# Patient Record
Sex: FEMALE | Race: WHITE | NOT HISPANIC OR LATINO | Employment: FULL TIME | URBAN - METROPOLITAN AREA
[De-identification: names, ages, dates, MRNs, and addresses within clinical notes are randomized per-mention and may not be internally consistent; named-entity substitution may affect disease eponyms.]

---

## 2019-07-25 ENCOUNTER — OFFICE VISIT (OUTPATIENT)
Dept: OBGYN CLINIC | Facility: CLINIC | Age: 29
End: 2019-07-25
Payer: COMMERCIAL

## 2019-07-25 VITALS — DIASTOLIC BLOOD PRESSURE: 60 MMHG | SYSTOLIC BLOOD PRESSURE: 100 MMHG | WEIGHT: 128 LBS

## 2019-07-25 DIAGNOSIS — Z30.41 ENCOUNTER FOR SURVEILLANCE OF CONTRACEPTIVE PILLS: Primary | ICD-10-CM

## 2019-07-25 PROCEDURE — 99202 OFFICE O/P NEW SF 15 MIN: CPT | Performed by: NURSE PRACTITIONER

## 2019-07-25 RX ORDER — BIOTIN 10 MG
TABLET ORAL
COMMUNITY

## 2019-07-25 RX ORDER — ETHINYL ESTRADIOL/DROSPIRENONE 0.02-3(28)
TABLET ORAL
Refills: 0 | COMMUNITY
Start: 2019-06-28 | End: 2019-07-25 | Stop reason: SDUPTHER

## 2019-07-25 RX ORDER — ETHINYL ESTRADIOL/DROSPIRENONE 0.02-3(28)
1 TABLET ORAL DAILY
Qty: 84 TABLET | Refills: 0 | Status: SHIPPED | OUTPATIENT
Start: 2019-07-25 | End: 2019-10-04 | Stop reason: SDUPTHER

## 2019-07-25 NOTE — PROGRESS NOTES
Assessment/Plan   There are no diagnoses linked to this encounter  Discussion    Reviewed with patient normal exam today  Pap with HPV done today  Normal breast exam today  Monthly SBEs advised  Vitamin D and Calcim Supplements advised  Exercise most days of the week  Follow with PCP for regular check-ups and blood work  RTO 1 year for annual or sooner as needed  Annette Dubois is a 33 y o  female old/new patient who presents for annual well woman exam     Last exam  Pap   Pap guidelines reviewed with patient  Pt would like Pap today  Pt denies any abnormal vaginal discharge, itching, or odor  Pt in a mutually exclusive relationship () with a male partner and denies the need for STD testing today  Menstrual Cycle:  LMP:   Contraception:   Practices monthly SBEs, no breast complaints today  Last Mammogram   Colonoscopy   Denies any bowel or bladder issues  Pt follows with PCP for regular check-ups and blood work  Review of Systems   All other systems reviewed and are negative  The following portions of the patient's history were reviewed and updated as appropriate: allergies, current medications, past family history, past medical history, past social history, past surgical history and problem list     No past medical history on file  No past surgical history on file  No family history on file      Social History     Socioeconomic History    Marital status: Unknown     Spouse name: Not on file    Number of children: Not on file    Years of education: Not on file    Highest education level: Not on file   Occupational History    Not on file   Social Needs    Financial resource strain: Not on file    Food insecurity:     Worry: Not on file     Inability: Not on file    Transportation needs:     Medical: Not on file     Non-medical: Not on file   Tobacco Use    Smoking status: Not on file   Substance and Sexual Activity    Alcohol use: Not on file  Drug use: Not on file    Sexual activity: Not on file   Lifestyle    Physical activity:     Days per week: Not on file     Minutes per session: Not on file    Stress: Not on file   Relationships    Social connections:     Talks on phone: Not on file     Gets together: Not on file     Attends Muslim service: Not on file     Active member of club or organization: Not on file     Attends meetings of clubs or organizations: Not on file     Relationship status: Not on file    Intimate partner violence:     Fear of current or ex partner: Not on file     Emotionally abused: Not on file     Physically abused: Not on file     Forced sexual activity: Not on file   Other Topics Concern    Not on file   Social History Narrative    Not on file       No current outpatient medications on file  Allergies not on file    Objective   There were no vitals filed for this visit  Physical Exam   Constitutional: She is oriented to person, place, and time  She appears well-developed and well-nourished  HENT:   Head: Normocephalic  Neck: Normal range of motion  Neck supple  No tracheal deviation present  No thyromegaly present  Cardiovascular: Normal rate, regular rhythm and normal heart sounds  Pulmonary/Chest: Effort normal and breath sounds normal  Right breast exhibits no inverted nipple, no mass, no nipple discharge, no skin change and no tenderness  Left breast exhibits no inverted nipple, no mass, no nipple discharge, no skin change and no tenderness  No breast tenderness, discharge or bleeding  Breasts are symmetrical    Abdominal: Soft  Bowel sounds are normal  She exhibits no distension and no mass  There is no tenderness  There is no rebound and no guarding  Genitourinary: Vagina normal and uterus normal  No breast tenderness, discharge or bleeding  No labial fusion  There is no rash, tenderness, lesion or injury on the right labia  There is no rash, tenderness, lesion or injury on the left labia  Cervix exhibits no motion tenderness, no discharge and no friability  Right adnexum displays no mass, no tenderness and no fullness  Left adnexum displays no mass, no tenderness and no fullness  Musculoskeletal: Normal range of motion  Neurological: She is alert and oriented to person, place, and time  Skin: Skin is warm and dry  Psychiatric: She has a normal mood and affect   Her behavior is normal  Judgment and thought content normal

## 2019-07-25 NOTE — ASSESSMENT & PLAN NOTE
Contraceptive options reviewed  Discussed continuing Nany since no side effects, switching another OCP or another contraceptive method  Contraceptive methods reviewed such as patch, ring, injection, implant, IUD  Would like to stick with a pill  Reviewed can switch OCP but cannot guarantee she will not have side effects or that her menses will be any lighter or shorter than they currently are  Pt decided to continue with Nany as worried about potential side effects she might occur from other methods or another OCP  Rx for True Ivonne (Brand name requested per patient)  sent to pharmacy     RTO for annual exam

## 2019-07-25 NOTE — PROGRESS NOTES
Assessment/Plan:    Encounter for surveillance of contraceptive pills  Contraceptive options reviewed  Discussed continuing Kory since no side effects, switching another OCP or another contraceptive method  Contraceptive methods reviewed such as patch, ring, injection, implant, IUD  Would like to stick with a pill  Reviewed can switch OCP but cannot guarantee she will not have side effects or that her menses will be any lighter or shorter than they currently are  Pt decided to continue with Kory as worried about potential side effects she might occur from other methods or another OCP  Rx for Katelyn Sales (Brand name requested per patient)  sent to pharmacy  RTO for annual exam       Diagnoses and all orders for this visit:    Encounter for surveillance of contraceptive pills  -     KORY 3-0 02 MG per tablet; Take 1 tablet by mouth daily    Other orders  -     Biotin 10 MG TABS; Take by mouth  -     Discontinue: KORY 3-0 02 MG per tablet; TK 1 T PO D          Subjective:      Patient ID: Librado Fisher is a 33 y o  female  Pt is a new patient to our office and originally scheduled for annual exam but due to menses had to reschedule  Would like to discuss birth control, so appointment changed to contraceptive discussion  Menses were very irregular and started on OCP at age 14  Has been on Kory ever since  Has never tried anything else  Thinks menses are longer and slightly heavier than then should be on OCP  Menses occur monthly last about 4-7 days and are of a moderate flow  Will change tampon every 3-4 hours on her heaviest days  Has about 2-3 heavy days  Denies any bleeding in between menses  Denies any pain with menses  Denies any other side effects from OCP  Would like to discuss other options      LMP: 7/21/19   Period Cycle (Days): 30  Period Duration (Days): 4-7  Period Pattern: Regular  Menstrual Flow: Moderate  Menstrual Control: Tampon  Menstrual Control Change Freq (Hours): 3-4  Dysmenorrhea: None    Last annual exam and pap smear 2017 Abnormal, unsure of abnormality  The following portions of the patient's history were reviewed and updated as appropriate: allergies, current medications, past family history, past medical history, past social history, past surgical history and problem list     Review of Systems   All other systems reviewed and are negative  Objective:      /60   Wt 58 1 kg (128 lb)   LMP 07/21/2019   Breastfeeding? No          Physical Exam   Constitutional: She is oriented to person, place, and time  She appears well-developed and well-nourished  Cardiovascular: Normal rate, regular rhythm and normal heart sounds  Pulmonary/Chest: Effort normal and breath sounds normal    Abdominal: Soft  Bowel sounds are normal    Musculoskeletal: Normal range of motion  Neurological: She is alert and oriented to person, place, and time  Skin: Skin is warm and dry  Psychiatric: She has a normal mood and affect   Her behavior is normal  Judgment and thought content normal

## 2019-10-04 DIAGNOSIS — Z30.41 ENCOUNTER FOR SURVEILLANCE OF CONTRACEPTIVE PILLS: ICD-10-CM

## 2020-04-24 DIAGNOSIS — Z30.41 ENCOUNTER FOR SURVEILLANCE OF CONTRACEPTIVE PILLS: ICD-10-CM

## 2020-04-24 RX ORDER — DROSPIRENONE AND ETHINYL ESTRADIOL 0.02-3(28)
1 KIT ORAL DAILY
Qty: 84 TABLET | Refills: 2 | Status: SHIPPED | OUTPATIENT
Start: 2020-04-24 | End: 2020-11-03

## 2020-11-02 DIAGNOSIS — Z30.41 ENCOUNTER FOR SURVEILLANCE OF CONTRACEPTIVE PILLS: ICD-10-CM

## 2021-01-27 ENCOUNTER — ANNUAL EXAM (OUTPATIENT)
Dept: OBGYN CLINIC | Facility: CLINIC | Age: 31
End: 2021-01-27
Payer: COMMERCIAL

## 2021-01-27 VITALS
HEIGHT: 67 IN | WEIGHT: 130 LBS | SYSTOLIC BLOOD PRESSURE: 110 MMHG | DIASTOLIC BLOOD PRESSURE: 76 MMHG | BODY MASS INDEX: 20.4 KG/M2

## 2021-01-27 DIAGNOSIS — Z01.419 ROUTINE GYNECOLOGICAL EXAMINATION: Primary | ICD-10-CM

## 2021-01-27 DIAGNOSIS — Z30.41 ENCOUNTER FOR SURVEILLANCE OF CONTRACEPTIVE PILLS: ICD-10-CM

## 2021-01-27 DIAGNOSIS — N92.6 IRREGULAR MENSES: ICD-10-CM

## 2021-01-27 PROCEDURE — 99395 PREV VISIT EST AGE 18-39: CPT | Performed by: PHYSICIAN ASSISTANT

## 2021-01-27 RX ORDER — DROSPIRENONE AND ETHINYL ESTRADIOL 0.02-3(28)
1 KIT ORAL DAILY
Qty: 84 TABLET | Refills: 3 | Status: SHIPPED | OUTPATIENT
Start: 2021-01-27

## 2021-01-27 RX ORDER — ADAPALENE AND BENZOYL PEROXIDE 3; 25 MG/G; MG/G
GEL TOPICAL
COMMUNITY
Start: 2020-11-03

## 2021-01-27 NOTE — PROGRESS NOTES
Assessment/Plan   Problem List Items Addressed This Visit        Other    Encounter for surveillance of contraceptive pills    Relevant Medications    drospirenone-ethinyl estradiol (Gianvi) 3-0 02 MG per tablet    Routine gynecological examination - Primary     Pap guidelines reviewed  Pap with reflex done today  Reviewed irregular menses with patient  Will plan TFTs and CBC to further evaluate  Reviewed option of switching to a different OCP to see if would help  Would recommend considering [facility] OCP  Patient will consider and call office if desires switch  Will return to office for annual or as needed  Relevant Orders    IGP, rfx Aptima HPV ASCU      Other Visit Diagnoses     Irregular menses        Relevant Orders    T4, free    TSH, 3rd generation    CBC and differential          Subjective:     Patient ID: Sophia Greenwood is a 35 y o  y o  female  HPI  34 yo seen for annual exam  Currently on Nany OCP  Reports has been on since she was a teenager  Had tried Ortho Tricyclen first as well as Loestrin and didn't tolerate well  Had issues with acne and mood swings at that time and was recommended Nany  Patient reports tolerates Nany well but has always had irregular bleeding on it  Typically gets menses twice a month and not always during the placebo week  Sometimes light, other times normal flow  Denies any cramping  Reports did have history of ovarian cyst in high school and had a pelvic ultrasound done at that time that just showed the cyst    Last pap: 2017 LGSIL with (+)HRHPV Colpo benign  The following portions of the patient's history were reviewed and updated as appropriate:   She  has a past medical history of Asthma  She   Patient Active Problem List    Diagnosis Date Noted    Routine gynecological examination 01/27/2021    Encounter for surveillance of contraceptive pills 07/25/2019     She  has a past surgical history that includes Appendectomy    Her Family history is unknown by patient  She  reports that she has never smoked  She has never used smokeless tobacco  She reports current alcohol use  She reports that she does not use drugs  Current Outpatient Medications   Medication Sig Dispense Refill    drospirenone-ethinyl estradiol (Gianvi) 3-0 02 MG per tablet Take 1 tablet by mouth daily 84 tablet 3    Epiduo Forte 0 3-2 5 % GEL APPLY AT BEDTIME      Biotin 10 MG TABS Take by mouth       No current facility-administered medications for this visit  She has No Known Allergies       Menstrual History:  OB History        0    Para   0    Term   0       0    AB   0    Living   0       SAB   0    TAB   0    Ectopic   0    Multiple   0    Live Births   0                  Patient's last menstrual period was 2021 (approximate)  Review of Systems   Constitutional: Negative for fatigue, fever and unexpected weight change  HENT: Negative for dental problem and sinus pressure  Eyes: Negative for visual disturbance  Respiratory: Negative for cough, shortness of breath and wheezing  Cardiovascular: Negative for chest pain  Gastrointestinal: Negative for abdominal pain, blood in stool, constipation, diarrhea, nausea and vomiting  Endocrine: Negative for polydipsia  Genitourinary: Positive for menstrual problem  Negative for difficulty urinating, dyspareunia, dysuria, frequency, hematuria, pelvic pain and urgency  Musculoskeletal: Negative for arthralgias and back pain  Neurological: Negative for dizziness, seizures, light-headedness and headaches  Psychiatric/Behavioral: Negative for suicidal ideas  The patient is not nervous/anxious  Objective:  Vitals:    21 0732   BP: 110/76   BP Location: Left arm   Patient Position: Sitting   Cuff Size: Standard   Weight: 59 kg (130 lb)   Height: 5' 7" (1 702 m)      Physical Exam  Constitutional:       Appearance: Normal appearance  She is well-developed     Genitourinary:      Vulva, urethra, bladder, vagina and uterus normal       No vulval condylomata, lesion, tenderness, ulcerations, Bartholin's cyst or rash noted  No signs of labial injury  No labial fusion  No inguinal adenopathy present in the right or left side  No urethral prolapse, pain, swelling, tenderness, caruncle, mass or diverticulum present  Bladder is not distended or tender  No signs of injury or lesions in the vagina  No vaginal discharge, erythema, tenderness or bleeding  No cervical motion tenderness, discharge or lesion  Uterus is not enlarged, tender, irregular or mobile  No uterine mass detected  No right or left adnexal mass present  Right adnexa not tender or full  Left adnexa not tender or full  HENT:      Head: Normocephalic and atraumatic  Neck:      Thyroid: No thyromegaly  Cardiovascular:      Rate and Rhythm: Normal rate and regular rhythm  Heart sounds: Normal heart sounds  No murmur  No friction rub  No gallop  Pulmonary:      Effort: Pulmonary effort is normal  No respiratory distress  Breath sounds: Normal breath sounds  No wheezing  Chest:      Breasts: Breasts are symmetrical          Right: Normal  No swelling, bleeding, inverted nipple, mass, nipple discharge, skin change or tenderness  Left: Normal  No swelling, bleeding, inverted nipple, mass, nipple discharge, skin change or tenderness  Abdominal:      General: There is no distension  Palpations: Abdomen is soft  There is no mass  Tenderness: There is no abdominal tenderness  There is no guarding or rebound  Hernia: No hernia is present  Lymphadenopathy:      Cervical: No cervical adenopathy  Upper Body:      Right upper body: No supraclavicular, axillary or pectoral adenopathy  Left upper body: No supraclavicular, axillary or pectoral adenopathy  Lower Body: No right inguinal adenopathy  No left inguinal adenopathy     Neurological: Mental Status: She is alert and oriented to person, place, and time  Skin:     General: Skin is warm and dry     Psychiatric:         Behavior: Behavior normal

## 2021-01-27 NOTE — ASSESSMENT & PLAN NOTE
Pap guidelines reviewed  Pap with reflex done today  Reviewed irregular menses with patient  Will plan TFTs and CBC to further evaluate  Reviewed option of switching to a different OCP to see if would help  Would recommend considering [facility] OCP  Patient will consider and call office if desires switch  Will return to office for annual or as needed

## 2021-01-30 LAB
CYTOLOGIST CVX/VAG CYTO: NORMAL
DX ICD CODE: NORMAL
OTHER STN SPEC: NORMAL
OTHER STN SPEC: NORMAL
PATH REPORT.FINAL DX SPEC: NORMAL
SL AMB NOTE:: NORMAL
SL AMB SPECIMEN ADEQUACY: NORMAL
SL AMB TEST METHODOLOGY: NORMAL

## 2021-02-05 ENCOUNTER — TELEPHONE (OUTPATIENT)
Dept: OBGYN CLINIC | Facility: CLINIC | Age: 31
End: 2021-02-05

## 2021-02-10 LAB
BASOPHILS # BLD AUTO: 28 CELLS/UL (ref 0–200)
BASOPHILS NFR BLD AUTO: 0.8 %
EOSINOPHIL # BLD AUTO: 39 CELLS/UL (ref 15–500)
EOSINOPHIL NFR BLD AUTO: 1.1 %
ERYTHROCYTE [DISTWIDTH] IN BLOOD BY AUTOMATED COUNT: 11.8 % (ref 11–15)
HCT VFR BLD AUTO: 39.1 % (ref 35–45)
HGB BLD-MCNC: 13.1 G/DL (ref 11.7–15.5)
LYMPHOCYTES # BLD AUTO: 1260 CELLS/UL (ref 850–3900)
LYMPHOCYTES NFR BLD AUTO: 36 %
MCH RBC QN AUTO: 31 PG (ref 27–33)
MCHC RBC AUTO-ENTMCNC: 33.5 G/DL (ref 32–36)
MCV RBC AUTO: 92.7 FL (ref 80–100)
MONOCYTES # BLD AUTO: 277 CELLS/UL (ref 200–950)
MONOCYTES NFR BLD AUTO: 7.9 %
NEUTROPHILS # BLD AUTO: 1897 CELLS/UL (ref 1500–7800)
NEUTROPHILS NFR BLD AUTO: 54.2 %
PLATELET # BLD AUTO: 201 THOUSAND/UL (ref 140–400)
PMV BLD REES-ECKER: 10.5 FL (ref 7.5–12.5)
RBC # BLD AUTO: 4.22 MILLION/UL (ref 3.8–5.1)
T4 FREE SERPL-MCNC: 1.2 NG/DL (ref 0.8–1.8)
TSH SERPL-ACNC: 2.21 MIU/L
WBC # BLD AUTO: 3.5 THOUSAND/UL (ref 3.8–10.8)

## 2021-02-16 ENCOUNTER — TELEPHONE (OUTPATIENT)
Dept: OBGYN CLINIC | Facility: CLINIC | Age: 31
End: 2021-02-16

## 2021-03-31 DIAGNOSIS — Z23 ENCOUNTER FOR IMMUNIZATION: ICD-10-CM

## 2021-04-05 ENCOUNTER — OFFICE VISIT (OUTPATIENT)
Dept: FAMILY MEDICINE CLINIC | Facility: CLINIC | Age: 31
End: 2021-04-05
Payer: COMMERCIAL

## 2021-04-05 VITALS
BODY MASS INDEX: 20.76 KG/M2 | HEIGHT: 66 IN | DIASTOLIC BLOOD PRESSURE: 82 MMHG | TEMPERATURE: 97.2 F | OXYGEN SATURATION: 99 % | SYSTOLIC BLOOD PRESSURE: 110 MMHG | WEIGHT: 129.2 LBS | HEART RATE: 74 BPM

## 2021-04-05 DIAGNOSIS — Z11.4 SCREENING FOR HIV (HUMAN IMMUNODEFICIENCY VIRUS): ICD-10-CM

## 2021-04-05 DIAGNOSIS — E53.8 CYANOCOBALAMIN DEFICIENCY: ICD-10-CM

## 2021-04-05 DIAGNOSIS — D22.5 MELANOCYTIC NEVUS OF TRUNK: ICD-10-CM

## 2021-04-05 DIAGNOSIS — R19.7 DIARRHEA, UNSPECIFIED TYPE: ICD-10-CM

## 2021-04-05 DIAGNOSIS — L70.0 ACNE VULGARIS: ICD-10-CM

## 2021-04-05 DIAGNOSIS — Z28.39 IMMUNIZATION DEFICIENCY: ICD-10-CM

## 2021-04-05 DIAGNOSIS — E55.9 VITAMIN D DEFICIENCY: ICD-10-CM

## 2021-04-05 DIAGNOSIS — E51.9 THIAMINE DEFICIENCY: ICD-10-CM

## 2021-04-05 DIAGNOSIS — Z79.899 ENCOUNTER FOR LONG-TERM CURRENT USE OF MEDICATION: ICD-10-CM

## 2021-04-05 DIAGNOSIS — E53.1 PYRIDOXINE DEFICIENCY: ICD-10-CM

## 2021-04-05 DIAGNOSIS — E54 ASCORBIC ACID DEFICIENCY: ICD-10-CM

## 2021-04-05 DIAGNOSIS — N92.6 IRREGULAR MENSES: Primary | ICD-10-CM

## 2021-04-05 DIAGNOSIS — Z13.6 SCREENING FOR CARDIOVASCULAR CONDITION: ICD-10-CM

## 2021-04-05 PROBLEM — D22.9 PIGMENTED NEVUS: Status: ACTIVE | Noted: 2021-04-05

## 2021-04-05 PROCEDURE — 3008F BODY MASS INDEX DOCD: CPT | Performed by: FAMILY MEDICINE

## 2021-04-05 PROCEDURE — 3725F SCREEN DEPRESSION PERFORMED: CPT | Performed by: FAMILY MEDICINE

## 2021-04-05 PROCEDURE — 99204 OFFICE O/P NEW MOD 45 MIN: CPT | Performed by: FAMILY MEDICINE

## 2021-04-05 NOTE — PROGRESS NOTES
Assessment/Plan:  Will need workup for PCOS inflammatory bowel disease will look into cushion ordered and stress hormone  She is on birth control for long time will look into cholesterol level checked  MCV is large on blood test will look into vitamin D level  Will need to monitor her moles on her body closely  Will see her back in 3 months follow-up blood test results and follow-up her skin check  Advised to check with insurance for HPV vaccine coverage  Advised to stop probiotics Pap supplement with niacin orally that can increase her blood sugar  I have spent 45 minutes with Patient  today in which greater than 50% of this time was spent in counseling/coordination of care regarding Risks and benefits of tx options             Problem List Items Addressed This Visit        Musculoskeletal and Integument    Acne vulgaris       Other    Irregular menses - Primary    Relevant Orders    Testosterone, free, total    FSH and LH    17-Hydroxyprogesterone    Prolactin    Antimullerian hormone (AMH)    DHEA-sulfate    Diarrhea    Relevant Orders    CORAL Screen w/ Reflex to Titer/Pattern    CBC and differential    Celiac Disease Panel    Comprehensive metabolic panel    H  pylori antigen, stool    TSH, 3rd generation with Free T4 reflex    UA w Reflex to Microscopic w Reflex to Culture    Inflammatory Bowel Disease-IBD    Sedimentation rate, automated    C-reactive protein    Chromogranin A    Cortisol Level, AM Specimen    Pigmented nevus    Encounter for long-term current use of medication    Relevant Orders    Lipid Panel with Direct LDL reflex      Other Visit Diagnoses     Immunization deficiency        Relevant Orders    Hepatitis A antibody, total    Hepatitis B surface antibody    Measles/Mumps/Rubella Immunity    Vitamin D deficiency        Relevant Orders    Vitamin D 25 hydroxy    Cyanocobalamin deficiency        Relevant Orders    Vitamin B12    Screening for cardiovascular condition        Relevant Orders    Lipid Panel with Direct LDL reflex    Thiamine deficiency        Relevant Orders    Vitamin B1 (Thiamine), Serum/Plasma, LC/MS/MS    Pyridoxine deficiency        Relevant Orders    Vitamin B6    Ascorbic acid deficiency        Relevant Orders    Vitamin C    Screening for HIV (human immunodeficiency virus)        Relevant Orders    HIV 1/2 Antigen/Antibody (4th Generation) w Reflex [facility]            Subjective:      Patient ID: Gabriela Dorsey is a 35 y o  female  25-year-old female for establish care  She has suffer from irregular menses  For as long as she can remember  She had menses in 5th grade  Since then has always been irregular  She was placed on birth control at 14years old and still her menses not regular  She also had acne and follow-up with dermatologist   She was placed on spironolactone and carmela which helped her acne but then she was concerned about spironolactone causing high potassium so she stopped  She was told she has anemia in the past   Recently she saw gyn had CBC in thyroid level checked that was normal   There was suggestion to change her birth control but she is hesitant about that  She is   She is not ready to get started on family right now  She also suffer from diarrhea for as long she can remember  She was checked by gastroenterologist in the past workup was normal   She had appendix removed in 2012 was told that she has microscopic colitis every day that she didn't noticed  No family history of inflammatory bowel disease  There is maternal grandmom with questionable ovaries issues  She has never been pregnant  She works in [employer]    She has had her [address] vaccine=had fever/myalgia after      The following portions of the patient's history were reviewed and updated as appropriate:   Past Medical History:  She has a past medical history of Acne vulgaris (4/5/2021), Asthma, Diarrhea (4/5/2021), Encounter for long-term current use of medication (4/5/2021), Irregular menses (4/5/2021), and Pigmented nevus (4/5/2021)  ,  _______________________________________________________________________  Medical Problems:  does not have any pertinent problems on file ,  _______________________________________________________________________  Past Surgical History:   has a past surgical history that includes Appendectomy  ,  _______________________________________________________________________  Family History:  Family history is unknown by patient  ,  _______________________________________________________________________  Social History:   reports that she has never smoked  She has never used smokeless tobacco  She reports current alcohol use  She reports that she does not use drugs  ,  _______________________________________________________________________  Allergies:  has No Known Allergies     _______________________________________________________________________  Current Outpatient Medications   Medication Sig Dispense Refill    drospirenone-ethinyl estradiol (Gianvi) 3-0 02 MG per tablet Take 1 tablet by mouth daily 84 tablet 3    Multiple Vitamin (MULTIVITAMIN ADULT PO) Take by mouth      Probiotic Product (PROBIOTIC DAILY PO) Take by mouth      Biotin 10 MG TABS Take by mouth      Epiduo Forte 0 3-2 5 % GEL APPLY AT BEDTIME       No current facility-administered medications for this visit       _______________________________________________________________________  Review of Systems   Constitutional: Negative for activity change, appetite change, fatigue and unexpected weight change  HENT: Negative for ear pain, sore throat, trouble swallowing and voice change  Eyes: Negative for photophobia and visual disturbance  Respiratory: Negative for cough, chest tightness, shortness of breath and wheezing  Cardiovascular: Negative for chest pain, palpitations and leg swelling  Gastrointestinal: Positive for diarrhea   Negative for abdominal pain, constipation, nausea, rectal pain and vomiting  Endocrine: Negative for cold intolerance, polydipsia and polyuria  Genitourinary: Positive for menstrual problem  Negative for difficulty urinating, dysuria, flank pain and pelvic pain  Musculoskeletal: Negative for arthralgias, joint swelling and myalgias  Skin: Negative for color change and rash  Allergic/Immunologic: Negative for environmental allergies and immunocompromised state  Neurological: Negative for dizziness, weakness, numbness and headaches  Hematological: Negative for adenopathy  Does not bruise/bleed easily  Psychiatric/Behavioral: Negative for decreased concentration, dysphoric mood, self-injury, sleep disturbance and suicidal ideas  The patient is not nervous/anxious  Objective:  Vitals:    04/05/21 0917   BP: 110/82   BP Location: Right arm   Patient Position: Sitting   Cuff Size: Standard   Pulse: 74   Temp: (!) 97 2 °F (36 2 °C)   TempSrc: Temporal   SpO2: 99%   Weight: 58 6 kg (129 lb 3 2 oz)   Height: 5' 5 5" (1 664 m)     Body mass index is 21 17 kg/m²  Physical Exam  Vitals signs and nursing note reviewed  Constitutional:       Appearance: Normal appearance  She is well-developed and normal weight  HENT:      Head: Normocephalic and atraumatic  Right Ear: Tympanic membrane normal       Left Ear: Tympanic membrane normal       Mouth/Throat:      Pharynx: No oropharyngeal exudate  Eyes:      Extraocular Movements: Extraocular movements intact  Pupils: Pupils are equal, round, and reactive to light  Neck:      Musculoskeletal: Normal range of motion and neck supple  Thyroid: No thyromegaly  Cardiovascular:      Rate and Rhythm: Normal rate and regular rhythm  Heart sounds: Normal heart sounds  No murmur  Pulmonary:      Effort: Pulmonary effort is normal  No respiratory distress  Breath sounds: Normal breath sounds  No wheezing or rales     Abdominal:      General: Bowel sounds are normal  There is no distension  Palpations: Abdomen is soft  Tenderness: There is no abdominal tenderness  There is no guarding  Genitourinary:     Vagina: Normal  No vaginal discharge  Musculoskeletal: Normal range of motion  General: No tenderness  Skin:     General: Skin is warm and dry  Capillary Refill: Capillary refill takes less than 2 seconds  Findings: No rash  Comments: Scars from previous cystic acne, closed comedones on chin  Left abdomen 15x7 mm raised dark nevi, irrgular border  Right lower back 10x8 mm dark nevi  Left upper back 2 additional nevi w irregular pattern   Neurological:      General: No focal deficit present  Mental Status: She is alert and oriented to person, place, and time  Mental status is at baseline  Psychiatric:         Mood and Affect: Mood normal          Behavior: Behavior normal          Thought Content:  Thought content normal          Judgment: Judgment normal

## 2021-05-18 NOTE — PATIENT INSTRUCTIONS
Reviewed option of switching to [facility]az OCP to see if stopped breakthrough bleeding  Patient will consider 
25.5

## 2021-05-19 ENCOUNTER — OFFICE VISIT (OUTPATIENT)
Dept: FAMILY MEDICINE CLINIC | Facility: CLINIC | Age: 31
End: 2021-05-19
Payer: COMMERCIAL

## 2021-05-19 VITALS
RESPIRATION RATE: 16 BRPM | WEIGHT: 131.8 LBS | TEMPERATURE: 97.3 F | OXYGEN SATURATION: 99 % | SYSTOLIC BLOOD PRESSURE: 100 MMHG | BODY MASS INDEX: 21.18 KG/M2 | HEIGHT: 66 IN | DIASTOLIC BLOOD PRESSURE: 64 MMHG | HEART RATE: 69 BPM

## 2021-05-19 DIAGNOSIS — L73.9 FOLLICULITIS: Primary | ICD-10-CM

## 2021-05-19 PROCEDURE — 3008F BODY MASS INDEX DOCD: CPT | Performed by: NURSE PRACTITIONER

## 2021-05-19 PROCEDURE — 99214 OFFICE O/P EST MOD 30 MIN: CPT | Performed by: NURSE PRACTITIONER

## 2021-05-19 PROCEDURE — 1036F TOBACCO NON-USER: CPT | Performed by: NURSE PRACTITIONER

## 2021-05-19 RX ORDER — CEPHALEXIN 500 MG/1
500 CAPSULE ORAL EVERY 8 HOURS SCHEDULED
Qty: 21 CAPSULE | Refills: 0 | Status: SHIPPED | OUTPATIENT
Start: 2021-05-19 | End: 2021-05-26

## 2021-05-19 NOTE — PROGRESS NOTES
BMI Counseling: Body mass index is 21 6 kg/m²  The BMI is above normal  Nutrition recommendations include decreasing portion sizes, encouraging healthy choices of fruits and vegetables, decreasing fast food intake, consuming healthier snacks, limiting drinks that contain sugar, moderation in carbohydrate intake, increasing intake of lean protein, reducing intake of saturated and trans fat and reducing intake of cholesterol  Exercise recommendations include vigorous physical activity 75 minutes/week, exercising 3-5 times per week and strength training exercises  No pharmacotherapy was ordered  NORMAL BMI       Assessment/Plan:    follicular cyst to right groin - Patient of Dr Sue Pinto for about 1 week now  Discussed treatment   Will start with localized with Bactroban ointment and Keflex   Discussed supportive measures --> warm compressors  And Epson salt soaks  Avoid shaving  Or waxing to that area   If worsening of symptoms --> return or ER if increase pain swelling or fevers        Problem List Items Addressed This Visit     None      Visit Diagnoses     Folliculitis    -  Primary    right gregory ingrown hair       Relevant Medications    mupirocin (BACTROBAN) 2 % ointment        Keflex sent to pharmacy     Subjective:      Patient ID: Regina Simon is a 35 y o  female  follicular cyst to right groin - Patient of Dr Sue Pinto for about 1 week now  The following portions of the patient's history were reviewed and updated as appropriate:   Past Medical History:  She has a past medical history of Acne vulgaris (4/5/2021), Asthma, Diarrhea (4/5/2021), Encounter for long-term current use of medication (4/5/2021), Irregular menses (4/5/2021), and Pigmented nevus (4/5/2021)  ,  _______________________________________________________________________  Medical Problems:  does not have any pertinent problems on file ,  _______________________________________________________________________  Past Surgical History: has a past surgical history that includes Appendectomy  ,  _______________________________________________________________________  Family History:  Family history is unknown by patient  ,  _______________________________________________________________________  Social History:   reports that she has never smoked  She has never used smokeless tobacco  She reports current alcohol use  She reports that she does not use drugs  ,  _______________________________________________________________________  Allergies:  has No Known Allergies     _______________________________________________________________________  Current Outpatient Medications   Medication Sig Dispense Refill    drospirenone-ethinyl estradiol (Gianvi) 3-0 02 MG per tablet Take 1 tablet by mouth daily 84 tablet 3    Epiduo Forte 0 3-2 5 % GEL APPLY AT BEDTIME      Multiple Vitamin (MULTIVITAMIN ADULT PO) Take by mouth      Probiotic Product (PROBIOTIC DAILY PO) Take by mouth      Biotin 10 MG TABS Take by mouth      mupirocin (BACTROBAN) 2 % ointment Apply topically 3 (three) times a day 22 g 1     No current facility-administered medications for this visit       _______________________________________________________________________  Review of Systems   Constitutional: Negative for chills and fatigue  HENT: Negative for congestion, sinus pain and sore throat  Eyes: Negative  Respiratory: Negative for cough and shortness of breath  Cardiovascular: Negative for chest pain and palpitations  Gastrointestinal: Negative for abdominal distention, abdominal pain, nausea and vomiting  Endocrine: Negative  Genitourinary: Negative for difficulty urinating and flank pain  Musculoskeletal: Negative  Skin:        Follicular cyst to groin    Allergic/Immunologic: Positive for environmental allergies  Neurological: Negative for headaches  Hematological: Negative for adenopathy     Psychiatric/Behavioral: Negative for self-injury and suicidal ideas          Objective:  Vitals:    05/19/21 1610   BP: 100/64   BP Location: Left arm   Patient Position: Sitting   Cuff Size: Standard   Pulse: 69   Resp: 16   Temp: (!) 97 3 °F (36 3 °C)   TempSrc: Temporal   SpO2: 99%   Weight: 59 8 kg (131 lb 12 8 oz)   Height: 5' 5 5" (1 664 m)     Body mass index is 21 6 kg/m²  Physical Exam  Constitutional:       Appearance: Normal appearance  HENT:      Mouth/Throat:      Mouth: Mucous membranes are moist    Eyes:      Extraocular Movements: Extraocular movements intact  Pupils: Pupils are equal, round, and reactive to light  Neck:      Musculoskeletal: Normal range of motion  Cardiovascular:      Rate and Rhythm: Normal rate and regular rhythm  Pulses: Normal pulses  Heart sounds: Normal heart sounds  Pulmonary:      Effort: Pulmonary effort is normal       Breath sounds: Normal breath sounds  Musculoskeletal: Normal range of motion  Skin:     Capillary Refill: Capillary refill takes less than 2 seconds  Findings: Erythema (to left groin ) present  Neurological:      Mental Status: She is alert

## 2021-05-31 NOTE — PATIENT INSTRUCTIONS
Folliculitis   WHAT YOU NEED TO KNOW:   Folliculitis is inflammation of your hair follicles  A hair follicle is a sac under your skin  Your hair grows out of the follicle  Folliculitis is caused by bacteria or fungus, most commonly a germ called Staph  Folliculitis can occur anywhere you have hair  DISCHARGE INSTRUCTIONS:   Medicines:   · Antibiotics: This medicine is given to fight or prevent an infection caused by bacteria  It may be given as an ointment that you apply to your skin or as a pill  Always take your antibiotics exactly as ordered by your healthcare provider  Never save antibiotics or take leftover antibiotics that were given to you for another illness  · Antifungal medicine: This medicine helps kill fungus that may be causing your folliculitis  It may be given as an cream that you apply to your skin or as a pill  · NSAIDs , such as ibuprofen, help decrease swelling, pain, and fever  This medicine is available with or without a doctor's order  NSAIDs can cause stomach bleeding or kidney problems in certain people  If you take blood thinner medicine, always ask if NSAIDs are safe for you  Always read the medicine label and follow directions  Do not give these medicines to children under 10months of age without direction from your child's healthcare provider  · Antihistamines: This medicine may be given to help decrease itching  · Take your medicine as directed  Contact your healthcare provider if you think your medicine is not helping or if you have side effects  Tell him of her if you are allergic to any medicine  Keep a list of the medicines, vitamins, and herbs you take  Include the amounts, and when and why you take them  Bring the list or the pill bottles to follow-up visits  Carry your medicine list with you in case of an emergency      Follow up with your healthcare provider or dermatologist as directed:  Write down your questions so you remember to ask them during your visits  Manage folliculitis:   · Use warm compresses:  Wet a washcloth with warm water and apply it to the infected skin area to help decrease pain and swelling  Warm compresses may also help drain pus and improve healing  · Clean the area:  Use antibacterial soap to wash the affected area  Change your washcloths and towels every day  · Avoid shaving the area: If possible, do not shave areas that have folliculitis  If you must shave, use an electric razor or new blade every time you shave  Prevent folliculitis:   · Do not share personal items:  Do not share towels, soap, or any personal items with other people  · Do not wear tight clothing:  Do not wear tight-fitting clothes that rub against and irritate your skin  · Treat skin injuries right away:  Treat injuries such as cuts and scrapes right away  Wash them with warm, soapy water, and cover the area to prevent infection  Contact your healthcare provider or dermatologist if:  · You have a fever  · You have foul-smelling pus coming from the bumps on your skin  · Your rash is spreading  · You have questions or concerns about your condition or care  Return to the emergency department if:  · You develop large areas of red, warm, tender skin around the folliculitis  · You develop boils  © Copyright [address] Information is for End User's use only and may not be sold, redistributed or otherwise used for commercial purposes  All illustrations and images included in CareNotes® are the copyrighted property of A D A M , Inc  or [facility] Angela Gonzales   The above information is an  only  It is not intended as medical advice for individual conditions or treatments  Talk to your doctor, nurse or pharmacist before following any medical regimen to see if it is safe and effective for you

## 2021-12-23 ENCOUNTER — TELEMEDICINE (OUTPATIENT)
Dept: FAMILY MEDICINE CLINIC | Facility: CLINIC | Age: 31
End: 2021-12-23
Payer: COMMERCIAL

## 2021-12-23 DIAGNOSIS — Z03.818 ENCOUNTER FOR OBSERVATION FOR SUSPECTED EXPOSURE TO OTHER BIOLOGICAL AGENTS RULED OUT: ICD-10-CM

## 2021-12-23 DIAGNOSIS — B34.9 VIRAL INFECTION, UNSPECIFIED: ICD-10-CM

## 2021-12-23 DIAGNOSIS — R05.9 COUGH: Primary | ICD-10-CM

## 2021-12-23 DIAGNOSIS — Z11.9 ENCOUNTER FOR SCREENING FOR INFECTIOUS AND PARASITIC DISEASES, UNSPECIFIED: ICD-10-CM

## 2021-12-23 PROCEDURE — 99214 OFFICE O/P EST MOD 30 MIN: CPT | Performed by: NURSE PRACTITIONER

## 2021-12-23 PROCEDURE — 87636 SARSCOV2 & INF A&B AMP PRB: CPT | Performed by: NURSE PRACTITIONER

## 2021-12-28 ENCOUNTER — TELEMEDICINE (OUTPATIENT)
Dept: FAMILY MEDICINE CLINIC | Facility: CLINIC | Age: 31
End: 2021-12-28
Payer: COMMERCIAL

## 2021-12-28 DIAGNOSIS — R09.81 NASAL CONGESTION: ICD-10-CM

## 2021-12-28 DIAGNOSIS — U07.1 COVID-19: Primary | ICD-10-CM

## 2021-12-28 PROCEDURE — 99213 OFFICE O/P EST LOW 20 MIN: CPT | Performed by: NURSE PRACTITIONER

## 2021-12-28 RX ORDER — FLUTICASONE PROPIONATE 50 MCG
1 SPRAY, SUSPENSION (ML) NASAL DAILY
Qty: 18 ML | Refills: 1 | Status: SHIPPED | OUTPATIENT
Start: 2021-12-28 | End: 2022-01-17 | Stop reason: SDUPTHER

## 2021-12-30 ENCOUNTER — PATIENT MESSAGE (OUTPATIENT)
Dept: FAMILY MEDICINE CLINIC | Facility: CLINIC | Age: 31
End: 2021-12-30

## 2022-01-11 DIAGNOSIS — Z30.41 SURVEILLANCE FOR BIRTH CONTROL, ORAL CONTRACEPTIVES: Primary | ICD-10-CM

## 2022-01-11 RX ORDER — DROSPIRENONE, ETHINYL ESTRADIOL AND LEVOMEFOLATE CALCIUM AND LEVOMEFOLATE CALCIUM 3-0.02(24)
1 KIT ORAL DAILY
Qty: 84 TABLET | Refills: 0 | Status: SHIPPED | OUTPATIENT
Start: 2022-01-11 | End: 2022-04-04

## 2022-01-11 RX ORDER — DROSPIRENONE, ETHINYL ESTRADIOL AND LEVOMEFOLATE CALCIUM AND LEVOMEFOLATE CALCIUM 3-0.02(24)
1 KIT ORAL DAILY
Qty: 84 TABLET | Refills: 1 | Status: SHIPPED | OUTPATIENT
Start: 2022-01-11 | End: 2022-01-11

## 2022-01-11 NOTE — TELEPHONE ENCOUNTER
Pt is currently taking Nany and has discussed last year switching to beyaz  Pt would like to switch to the [place] now  She is scheduled with you 3/2/22 but does not want to wait to her appt  Will need script to hold her to March appt  Pt is using [facility]

## 2022-01-11 NOTE — TELEPHONE ENCOUNTER
That is fine  She can switch to [place]  Have her finish out this pack and start the new pack as she would a new pack of pills

## 2022-01-17 DIAGNOSIS — U07.1 COVID-19: ICD-10-CM

## 2022-01-17 DIAGNOSIS — R09.81 NASAL CONGESTION: ICD-10-CM

## 2022-01-17 RX ORDER — FLUTICASONE PROPIONATE 50 MCG
1 SPRAY, SUSPENSION (ML) NASAL DAILY
Qty: 18 ML | Refills: 1 | Status: SHIPPED | OUTPATIENT
Start: 2022-01-17 | End: 2022-01-27

## 2022-04-02 DIAGNOSIS — Z30.41 SURVEILLANCE FOR BIRTH CONTROL, ORAL CONTRACEPTIVES: ICD-10-CM

## 2022-04-04 RX ORDER — DROSPIRENONE/ETHINYL ESTRADIOL/LEVOMEFOLATE CALCIUM AND LEVOMEFOLATE CALCIUM 3-0.02(24)
KIT ORAL
Qty: 84 TABLET | Refills: 0 | Status: SHIPPED | OUTPATIENT
Start: 2022-04-04 | End: 2022-06-17 | Stop reason: SDUPTHER

## 2022-06-17 ENCOUNTER — TELEPHONE (OUTPATIENT)
Dept: OBGYN CLINIC | Facility: CLINIC | Age: 32
End: 2022-06-17

## 2022-06-17 DIAGNOSIS — Z30.41 SURVEILLANCE FOR BIRTH CONTROL, ORAL CONTRACEPTIVES: ICD-10-CM

## 2022-06-17 RX ORDER — DROSPIRENONE/ETHINYL ESTRADIOL/LEVOMEFOLATE CALCIUM AND LEVOMEFOLATE CALCIUM 3-0.02(24)
1 KIT ORAL DAILY
Qty: 84 TABLET | Refills: 0 | Status: SHIPPED | OUTPATIENT
Start: 2022-06-17

## 2022-06-17 NOTE — TELEPHONE ENCOUNTER
Pt aware of recommendations regarding missed bc and when to start new pack and back up method, pt also requesting refill of bc [facility]  Pt aware needs apt for refill as last seen in office 1/2021 apt offered and scheduled  Script sent to provider to sign

## 2022-06-17 NOTE — TELEPHONE ENCOUNTER
Pt lmom - missed a few doses of BC in a row so looking for direction to make sure things go back to normal with her cycle

## 2022-09-20 DIAGNOSIS — Z30.41 SURVEILLANCE FOR BIRTH CONTROL, ORAL CONTRACEPTIVES: ICD-10-CM

## 2022-09-20 RX ORDER — DROSPIRENONE/ETHINYL ESTRADIOL/LEVOMEFOLATE CALCIUM AND LEVOMEFOLATE CALCIUM 3-0.02(24)
1 KIT ORAL DAILY
Qty: 84 TABLET | Refills: 0 | Status: SHIPPED | OUTPATIENT
Start: 2022-09-20

## 2022-10-12 PROBLEM — R05.9 COUGH: Status: RESOLVED | Noted: 2021-12-23 | Resolved: 2022-10-12

## 2022-10-12 PROBLEM — Z01.419 ROUTINE GYNECOLOGICAL EXAMINATION: Status: RESOLVED | Noted: 2021-01-27 | Resolved: 2022-10-12

## 2022-12-20 DIAGNOSIS — Z30.41 SURVEILLANCE FOR BIRTH CONTROL, ORAL CONTRACEPTIVES: ICD-10-CM

## 2022-12-20 RX ORDER — DROSPIRENONE, ETHINYL ESTRADIOL AND LEVOMEFOLATE CALCIUM AND LEVOMEFOLATE CALCIUM 3-0.02(24)
KIT ORAL
Qty: 84 TABLET | Refills: 0 | Status: SHIPPED | OUTPATIENT
Start: 2022-12-20

## 2023-01-19 ENCOUNTER — RA CDI HCC (OUTPATIENT)
Dept: OTHER | Facility: HOSPITAL | Age: 33
End: 2023-01-19

## 2023-01-19 NOTE — PROGRESS NOTES
[facility]  coding opportunities       Chart reviewed, no opportunity found: CHART REVIEWED, NO OPPORTUNITY FOUND        Patients Insurance        Commercial Insurance: [employer] [place]

## 2023-01-25 ENCOUNTER — ANNUAL EXAM (OUTPATIENT)
Dept: OBGYN CLINIC | Facility: CLINIC | Age: 33
End: 2023-01-25

## 2023-01-25 VITALS
HEIGHT: 67 IN | WEIGHT: 133.6 LBS | SYSTOLIC BLOOD PRESSURE: 114 MMHG | DIASTOLIC BLOOD PRESSURE: 76 MMHG | BODY MASS INDEX: 20.97 KG/M2

## 2023-01-25 DIAGNOSIS — Z71.85 HPV VACCINE COUNSELING: ICD-10-CM

## 2023-01-25 DIAGNOSIS — Z30.41 SURVEILLANCE FOR BIRTH CONTROL, ORAL CONTRACEPTIVES: ICD-10-CM

## 2023-01-25 DIAGNOSIS — Z01.419 ENCOUNTER FOR GYNECOLOGICAL EXAMINATION (GENERAL) (ROUTINE) WITHOUT ABNORMAL FINDINGS: Primary | ICD-10-CM

## 2023-01-25 RX ORDER — DROSPIRENONE, ETHINYL ESTRADIOL AND LEVOMEFOLATE CALCIUM AND LEVOMEFOLATE CALCIUM 3-0.02(24)
1 KIT ORAL DAILY
Qty: 84 TABLET | Refills: 3 | Status: SHIPPED | OUTPATIENT
Start: 2023-01-25

## 2023-01-25 NOTE — PROGRESS NOTES
Assessment/Plan   Diagnoses and all orders for this visit:    Encounter for gynecological examination (general) (routine) without abnormal findings  The current ASCCP guidelines were reviewed  Patient's last pap was 1/27/21 - WNL and therefore, a pap with HPV cotesting is not indicated at this time  I emphasized the importance of an annual pelvic and breast exam  Patient ok to defer pap today - will plan pap with HPV cotesting next year at annual     Surveillance for birth control, oral contraceptives  -     Drospiren-Eth Estrad-Levomefol 3-0 02-0 451 MG TABS; Take 1 tablet by mouth daily  Contraception - Reviewed alternate options - least amount of change would be trial of Kriss - reviewed 30 mcg may help shorten period length/lesson spotting - reviewed potential side effects and risks  Discussed alternate OCP vs nuva ring vs depo provera vs nexplanon vs IUDs; Patient not interested in LARC at this time because likely will consider conception in the near future  She is also hesitant about change and body adjusting - opts to continue with Beyaz 1 tab po daily at this time but will reach out if desires trial of Kriss  Reviewed correct way of taking OCP, missed pill protocol, side effects, VTE risks;    HPV vaccine counseling  The patient has not had the Gardasil vaccine series, which is recommended for patients from 545 years of age  Will consider but declines at this time  Discussion  I have discussed the importance of monthly self-breast exams, exercise and healthy diet as well as adequate intake of calcium and vitamin D  Encourage MVI q day and r/ulisses importance of folic acid;  Encourage 30-40 min weight bearing exercise most days of week  Encourage safe sexual practices; STI testing - declines  Breast cancer screening is not indicated at this time  All questions have been answered to her satisfaction  RTO for APE or sooner if needed    Subjective     HPI   James Roldan is a 33 y o  female who presents for annual well woman exam  2/2021 CBC and thyroid studies WNL; Menarche - 7; LMP - 1/16/23; Periods are reg q month and last 7-10 days - couple days of spotting start prior to placebo pills, then period, then couple days of spotting at the end - most months can bleed total of 7-10 days; No excessive bleeding; Typically no intermenstrual bleeding or spotting; Cramps are tolerable  For the most part takes pill consistently but does occasionally miss - maybe associates spotting with this; concern for weight gain and increase in acne with changing OCP   No vulvar itch/burn; No vaginal itch/burn; No abn discharge or odor; No urinary sx - burning/pain/frequency/hematuria  (+) SBEs - no breast masses, asymmetry, nipple discharge or bleeding, changes in skin of breast, or breast tenderness bilaterally  No abd/pelvic pain or HAs;   Pt is sexually active in a mutually monog/ sexual relationship; No issues with intercourse; She declines sti/hiv/hep testing; Feels safe at home  Current contraception: Celestia Ear  Gardasil - not done and declines at this time  (+) PCP for routine Bw/care; Last Pap - 1/27/21 - WNL  History of abnormal Pap smear: 2017 LSIL (+) HRHPV colpo benign    Review of Systems   Constitutional: Negative for activity change, fatigue, fever and unexpected weight change  HENT: Negative for congestion, dental problem, sinus pressure and sinus pain  Eyes: Negative for visual disturbance  Respiratory: Negative for cough, shortness of breath and wheezing  Cardiovascular: Negative for chest pain and leg swelling  Gastrointestinal: Negative for abdominal distention, abdominal pain, blood in stool, constipation, diarrhea, nausea and vomiting  Endocrine: Negative for polydipsia  Genitourinary: Negative for difficulty urinating, dyspareunia, dysuria, frequency, hematuria, menstrual problem, pelvic pain, urgency, vaginal bleeding, vaginal discharge and vaginal pain     Musculoskeletal: Negative for arthralgias and back pain  Allergic/Immunologic: Negative for environmental allergies  Neurological: Negative for dizziness, seizures and headaches  Psychiatric/Behavioral: Negative for dysphoric mood and sleep disturbance  The patient is not nervous/anxious  The following portions of the patient's history were reviewed and updated as appropriate: allergies, current medications, past family history, past medical history, past social history, past surgical history and problem list          OB History        0    Para   0    Term   0       0    AB   0    Living   0       SAB   0    IAB   0    Ectopic   0    Multiple   0    Live Births   0                 Past Medical History:   Diagnosis Date   • Acne vulgaris 2021   • Asthma    • Diarrhea 2021   • Encounter for long-term current use of medication 2021   • Irregular menses 2021   • Pigmented nevus 2021    Abdomen 15x7 mm Lower right back 10x8 mm Upper left back=2 nevi       Past Surgical History:   Procedure Laterality Date   • APPENDECTOMY         Family History   Problem Relation Age of Onset   • Parkinsonism Mother    • No Known Problems Father        Social History     Socioeconomic History   • Marital status: Unknown     Spouse name: Not on file   • Number of children: Not on file   • Years of education: Not on file   • Highest education level: Not on file   Occupational History   • Not on file   Tobacco Use   • Smoking status: Never   • Smokeless tobacco: Never   Vaping Use   • Vaping Use: Never used   Substance and Sexual Activity   • Alcohol use:  Yes     Alcohol/week: 12 0 standard drinks     Types: 8 Glasses of wine, 4 Standard drinks or equivalent per week   • Drug use: Never   • Sexual activity: Yes     Partners: Male     Birth control/protection: OCP   Other Topics Concern   • Not on file   Social History Narrative   • Not on file     Social Determinants of Health     Financial Resource Strain: Not on file Food Insecurity: Not on file   Transportation Needs: Not on file   Physical Activity: Not on file   Stress: Not on file   Social Connections: Not on file   Intimate Partner Violence: Not on file   Housing Stability: Not on file         Current Outpatient Medications:   •  COLLAGEN PO, Take by mouth, Disp: , Rfl:   •  Drospiren-Eth Estrad-Levomefol 3-0 02-0 451 MG TABS, Take 1 tablet by mouth daily, Disp: 84 tablet, Rfl: 3    No Known Allergies    Objective   Vitals:    01/25/23 0906   BP: 114/76   BP Location: Left arm   Patient Position: Sitting   Weight: 60 6 kg (133 lb 9 6 oz)   Height: 5' 7" (1 702 m)     Physical Exam  Vitals reviewed  Constitutional:       General: She is awake  She is not in acute distress  Appearance: Normal appearance  She is well-developed, well-groomed and normal weight  She is not ill-appearing, toxic-appearing or diaphoretic  HENT:      Head: Normocephalic and atraumatic  Eyes:      Conjunctiva/sclera: Conjunctivae normal    Neck:      Thyroid: No thyroid mass, thyromegaly or thyroid tenderness  Cardiovascular:      Rate and Rhythm: Normal rate and regular rhythm  Heart sounds: Normal heart sounds  No murmur heard  Pulmonary:      Effort: Pulmonary effort is normal  No tachypnea, bradypnea or respiratory distress  Breath sounds: Normal breath sounds  No stridor or decreased air movement  No wheezing  Chest:   Breasts:     Breasts are symmetrical       Right: Normal  No swelling, bleeding, inverted nipple, mass, nipple discharge, skin change or tenderness  Left: Normal  No swelling, bleeding, inverted nipple, mass, nipple discharge, skin change or tenderness  Abdominal:      General: There is no distension  Palpations: Abdomen is soft  There is no hepatomegaly, splenomegaly or mass  Tenderness: There is no abdominal tenderness  Hernia: No hernia is present  There is no hernia in the left inguinal area or right inguinal area  Genitourinary:     General: Normal vulva  Exam position: Supine  Pubic Area: No rash or pubic lice  Labia:         Right: No rash, tenderness, lesion or injury  Left: No rash, tenderness, lesion or injury  Urethra: No prolapse, urethral pain, urethral swelling or urethral lesion  Vagina: Normal  No signs of injury and foreign body  No vaginal discharge, erythema, tenderness, bleeding, lesions or prolapsed vaginal walls  Cervix: No cervical motion tenderness, discharge, friability, lesion, erythema or cervical bleeding  Uterus: Not deviated, not enlarged, not fixed, not tender and no uterine prolapse  Adnexa:         Right: No mass, tenderness or fullness  Left: No mass, tenderness or fullness  Lymphadenopathy:      Cervical: No cervical adenopathy  Upper Body:      Right upper body: No supraclavicular or axillary adenopathy  Left upper body: No supraclavicular or axillary adenopathy  Lower Body: No right inguinal adenopathy  No left inguinal adenopathy  Skin:     General: Skin is warm and dry  Comments: Multiple moles - doesn't routinely follow with derm but seeing PCP tomorrow who is also a dermatologist   Neurological:      Mental Status: She is alert and oriented to person, place, and time  Psychiatric:         Mood and Affect: Mood and affect normal          Speech: Speech normal          Behavior: Behavior normal  Behavior is cooperative  Thought Content:  Thought content normal          Judgment: Judgment normal

## 2023-01-25 NOTE — ASSESSMENT & PLAN NOTE
The current ASCCP guidelines were reviewed  Patient's last pap was 1/27/21 - WNL and therefore, a pap with HPV cotesting is not indicated at this time   I emphasized the importance of an annual pelvic and breast exam  Patient ok to defer pap today - will plan pap with HPV cotesting next year at annual

## 2023-01-26 ENCOUNTER — OFFICE VISIT (OUTPATIENT)
Dept: FAMILY MEDICINE CLINIC | Facility: CLINIC | Age: 33
End: 2023-01-26

## 2023-01-26 VITALS
HEART RATE: 79 BPM | SYSTOLIC BLOOD PRESSURE: 110 MMHG | BODY MASS INDEX: 21.16 KG/M2 | WEIGHT: 134.8 LBS | TEMPERATURE: 97.1 F | RESPIRATION RATE: 18 BRPM | OXYGEN SATURATION: 99 % | HEIGHT: 67 IN | DIASTOLIC BLOOD PRESSURE: 80 MMHG

## 2023-01-26 DIAGNOSIS — E56.0 VITAMIN E DEFICIENCY: ICD-10-CM

## 2023-01-26 DIAGNOSIS — E50.9 VITAMIN A DEFICIENCY: ICD-10-CM

## 2023-01-26 DIAGNOSIS — Z13.6 SCREENING FOR CARDIOVASCULAR CONDITION: ICD-10-CM

## 2023-01-26 DIAGNOSIS — E60 ZINC DEFICIENCY: ICD-10-CM

## 2023-01-26 DIAGNOSIS — E53.8 CYANOCOBALAMIN DEFICIENCY: ICD-10-CM

## 2023-01-26 DIAGNOSIS — E51.9 THIAMINE DEFICIENCY: ICD-10-CM

## 2023-01-26 DIAGNOSIS — Z00.00 ANNUAL PHYSICAL EXAM: ICD-10-CM

## 2023-01-26 DIAGNOSIS — Z28.39 IMMUNIZATION DEFICIENCY: ICD-10-CM

## 2023-01-26 DIAGNOSIS — E53.1 PYRIDOXINE DEFICIENCY: ICD-10-CM

## 2023-01-26 DIAGNOSIS — E55.9 VITAMIN D DEFICIENCY: ICD-10-CM

## 2023-01-26 DIAGNOSIS — D22.5 ATYPICAL NEVUS OF ABDOMINAL WALL: ICD-10-CM

## 2023-01-26 DIAGNOSIS — E54 ASCORBIC ACID DEFICIENCY: ICD-10-CM

## 2023-01-26 DIAGNOSIS — L70.0 ACNE VULGARIS: Primary | ICD-10-CM

## 2023-01-26 DIAGNOSIS — N92.6 IRREGULAR MENSES: ICD-10-CM

## 2023-01-26 NOTE — PROGRESS NOTES
[facility] C[facility] [place]    NAME: Monie Suarez  AGE: 33 y o   SEX: female  : 1990     DATE: 2023     Assessment and Plan:       Concern for atypical nevus on abdomen large size, high risk for melanoma=refer to derm also for acne on face  Need blood work since on ocp for a long time=check lipid/hormone/vitamins  Consider starting pt on spironolactone for hormone acne  Pt is , on ocp for contraception and menses  Fu w gyn=normal pap  Check insurance for hpv vaccine coverage    Problem List Items Addressed This Visit        Musculoskeletal and Integument    Acne vulgaris - Primary    Relevant Orders    Testosterone, free, total    Ambulatory Referral to Dermatology    Atypical nevus of abdominal wall    Relevant Orders    Ambulatory Referral to Dermatology       Other    Irregular menses    Relevant Orders    CBC and differential    Comprehensive metabolic panel    TSH, 3rd generation with Free T4 reflex    UA w Reflex to Microscopic w Reflex to Culture    Iron Panel (Includes Ferritin, Iron Sat%, Iron, and TIBC)    Testosterone, free, total   Other Visit Diagnoses     Screening for cardiovascular condition        Relevant Orders    Lipid Panel with Direct LDL reflex    Cyanocobalamin deficiency        Relevant Orders    Vitamin B12    Vitamin D deficiency        Relevant Orders    Vitamin D 25 hydroxy    Immunization deficiency        Relevant Orders    Hepatitis A antibody, total    Hepatitis B surface antibody    Measles/Mumps/Rubella Immunity    Vitamin A deficiency        Relevant Orders    Vitamin A    Thiamine deficiency        Relevant Orders    Vitamin B1 (Thiamine), Serum/Plasma, LC/MS/MS    Pyridoxine deficiency        Relevant Orders    Vitamin B6    Ascorbic acid deficiency        Relevant Orders    Vitamin C    Vitamin E deficiency        Relevant Orders    Vitamin E    Zinc deficiency        Relevant Orders Zinc    Annual physical exam              Immunizations and preventive care screenings were discussed with patient today  Appropriate education was printed on patient's after visit summary  Counseling:  Alcohol/drug use: discussed moderation in alcohol intake, the recommendations for healthy alcohol use, and avoidance of illicit drug use  Dental Health: discussed importance of regular tooth brushing, flossing, and dental visits  Injury prevention: discussed safety/seat belts, safety helmets, smoke detectors, carbon dioxide detectors, and smoking near bedding or upholstery  Sexual health: discussed sexually transmitted diseases, partner selection, use of condoms, avoidance of unintended pregnancy, and contraceptive alternatives  Exercise: the importance of regular exercise/physical activity was discussed  Recommend exercise 3-5 times per week for at least 30 minutes  Depression Screening and Follow-up Plan: Patient was screened for depression during today's encounter  They screened negative with a PHQ-2 score of 0  Return in about 6 months (around 7/26/2023) for established care w dr Roxanna Cunningham  Chief Complaint:     Chief Complaint   Patient presents with   • Physical Exam   • Mass     Patient stated has a bump on her right bottom leg and is very concerned has been there for a while now  History of Present Illness:     Adult Annual Physical   Patient here for a comprehensive physical exam  The patient reports no problems  Diet and Physical Activity  Diet/Nutrition: well balanced diet  Exercise: walking  Depression Screening  PHQ-2/9 Depression Screening    Little interest or pleasure in doing things: 0 - not at all  Feeling down, depressed, or hopeless: 0 - not at all  PHQ-2 Score: 0  PHQ-2 Interpretation: Negative depression screen       General Health  Sleep: sleeps well  Hearing: normal - bilateral   Vision: no vision problems  Dental: regular dental visits  /GYN Health  Last menstrual period: 3 weeks ago  Contraceptive method: oral contraceptives  History of STDs?: no      Review of Systems:     Review of Systems   Constitutional: Negative for activity change, appetite change, fatigue and unexpected weight change  HENT: Negative for ear pain, sore throat, trouble swallowing and voice change  Eyes: Negative for photophobia and visual disturbance  Respiratory: Negative for cough, chest tightness, shortness of breath and wheezing  Cardiovascular: Negative for chest pain, palpitations and leg swelling  Gastrointestinal: Negative for abdominal pain, constipation, diarrhea, nausea, rectal pain and vomiting  Endocrine: Negative for cold intolerance, polydipsia and polyuria  Genitourinary: Positive for menstrual problem  Negative for difficulty urinating, dysuria, flank pain and pelvic pain  Musculoskeletal: Negative for arthralgias, joint swelling and myalgias  Skin: Negative for color change and rash  acne   Allergic/Immunologic: Negative for environmental allergies and immunocompromised state  Neurological: Negative for dizziness, weakness, numbness and headaches  Hematological: Negative for adenopathy  Does not bruise/bleed easily  Psychiatric/Behavioral: Negative for decreased concentration, dysphoric mood, self-injury, sleep disturbance and suicidal ideas  The patient is not nervous/anxious         Past Medical History:     Past Medical History:   Diagnosis Date   • Acne vulgaris 4/5/2021   • Asthma    • Diarrhea 4/5/2021   • Encounter for long-term current use of medication 4/5/2021   • Irregular menses 4/5/2021   • Pigmented nevus 4/5/2021    Abdomen 15x7 mm Lower right back 10x8 mm Upper left back=2 nevi      Past Surgical History:     Past Surgical History:   Procedure Laterality Date   • APPENDECTOMY        Social History:     Social History     Socioeconomic History   • Marital status: Unknown     Spouse name: None   • Number of children: None   • Years of education: None   • Highest education level: None   Occupational History   • None   Tobacco Use   • Smoking status: Never   • Smokeless tobacco: Never   Vaping Use   • Vaping Use: Never used   Substance and Sexual Activity   • Alcohol use: Yes     Alcohol/week: 12 0 standard drinks     Types: 8 Glasses of wine, 4 Standard drinks or equivalent per week   • Drug use: Never   • Sexual activity: Yes     Partners: Male     Birth control/protection: OCP   Other Topics Concern   • None   Social History Narrative   • None     Social Determinants of Health     Financial Resource Strain: Not on file   Food Insecurity: Not on file   Transportation Needs: Not on file   Physical Activity: Not on file   Stress: Not on file   Social Connections: Not on file   Intimate Partner Violence: Not on file   Housing Stability: Not on file      Family History:     Family History   Problem Relation Age of Onset   • Parkinsonism Mother    • No Known Problems Father       Current Medications:     Current Outpatient Medications   Medication Sig Dispense Refill   • COLLAGEN PO Take by mouth     • Drospiren-Eth Estrad-Levomefol 3-0 02-0 451 MG TABS Take 1 tablet by mouth daily 84 tablet 3     No current facility-administered medications for this visit  Allergies:     No Known Allergies   Physical Exam:     /80 (BP Location: Left arm, Patient Position: Sitting, Cuff Size: Adult)   Pulse 79   Temp (!) 97 1 °F (36 2 °C) (Temporal)   Resp 18   Ht 5' 7" (1 702 m)   Wt 61 1 kg (134 lb 12 8 oz)   LMP 01/16/2023 (Exact Date)   SpO2 99%   BMI 21 11 kg/m²     Physical Exam  Vitals and nursing note reviewed  Constitutional:       General: She is not in acute distress  Appearance: Normal appearance  She is well-developed and normal weight  HENT:      Head: Normocephalic and atraumatic        Right Ear: Tympanic membrane, ear canal and external ear normal       Left Ear: Tympanic membrane, ear canal and external ear normal       Nose: Nose normal       Mouth/Throat:      Mouth: Mucous membranes are moist       Pharynx: Oropharynx is clear  Eyes:      Extraocular Movements: Extraocular movements intact  Conjunctiva/sclera: Conjunctivae normal       Pupils: Pupils are equal, round, and reactive to light  Cardiovascular:      Rate and Rhythm: Normal rate and regular rhythm  Pulses: Normal pulses  Heart sounds: Normal heart sounds  No murmur heard  Pulmonary:      Effort: Pulmonary effort is normal  No respiratory distress  Breath sounds: Normal breath sounds  Abdominal:      General: Abdomen is flat  Bowel sounds are normal       Palpations: Abdomen is soft  Tenderness: There is no abdominal tenderness  Musculoskeletal:         General: No swelling  Normal range of motion  Cervical back: Normal range of motion and neck supple  Skin:     General: Skin is warm and dry  Capillary Refill: Capillary refill takes less than 2 seconds  Comments: Cystic acne on face  1  Nevus on anterior abdomen measure 1 8 x 0 8 cm raised half black pigment, the other half light brown pigment, irregular edge and border  2  Nevus on right upper back 1 5x0 8 cm raised annular edge, uniform color  Small fibrocanthoma right anterior leg   Neurological:      General: No focal deficit present  Mental Status: She is alert and oriented to person, place, and time  Mental status is at baseline  Psychiatric:         Mood and Affect: Mood normal          Behavior: Behavior normal          Thought Content:  Thought content normal          Judgment: Judgment normal           Ty Graham MD   [facility]

## 2023-01-26 NOTE — PATIENT INSTRUCTIONS

## 2023-01-27 ENCOUNTER — APPOINTMENT (OUTPATIENT)
Dept: LAB | Facility: AMBULARY SURGERY CENTER | Age: 33
End: 2023-01-27

## 2023-01-27 DIAGNOSIS — E53.1 PYRIDOXINE DEFICIENCY: ICD-10-CM

## 2023-01-27 DIAGNOSIS — E53.8 CYANOCOBALAMIN DEFICIENCY: ICD-10-CM

## 2023-01-27 DIAGNOSIS — Z13.6 SCREENING FOR CARDIOVASCULAR CONDITION: ICD-10-CM

## 2023-01-27 DIAGNOSIS — E60 ZINC DEFICIENCY: ICD-10-CM

## 2023-01-27 DIAGNOSIS — N92.6 IRREGULAR MENSES: ICD-10-CM

## 2023-01-27 DIAGNOSIS — E51.9 THIAMINE DEFICIENCY: ICD-10-CM

## 2023-01-27 DIAGNOSIS — E55.9 VITAMIN D DEFICIENCY: ICD-10-CM

## 2023-01-27 DIAGNOSIS — L70.0 ACNE VULGARIS: ICD-10-CM

## 2023-01-27 DIAGNOSIS — E50.9 VITAMIN A DEFICIENCY: ICD-10-CM

## 2023-01-27 DIAGNOSIS — E56.0 VITAMIN E DEFICIENCY: ICD-10-CM

## 2023-01-27 DIAGNOSIS — E54 ASCORBIC ACID DEFICIENCY: ICD-10-CM

## 2023-01-27 DIAGNOSIS — Z28.39 IMMUNIZATION DEFICIENCY: ICD-10-CM

## 2023-01-27 LAB
25(OH)D3 SERPL-MCNC: 17.2 NG/ML (ref 30–100)
ALBUMIN SERPL BCP-MCNC: 3.7 G/DL (ref 3.5–5)
ALP SERPL-CCNC: 54 U/L (ref 46–116)
ALT SERPL W P-5'-P-CCNC: 28 U/L (ref 12–78)
ANION GAP SERPL CALCULATED.3IONS-SCNC: 4 MMOL/L (ref 4–13)
AST SERPL W P-5'-P-CCNC: 14 U/L (ref 5–45)
BACTERIA UR QL AUTO: ABNORMAL /HPF
BASOPHILS # BLD AUTO: 0.04 THOUSANDS/ÂΜL (ref 0–0.1)
BASOPHILS NFR BLD AUTO: 1 % (ref 0–1)
BILIRUB SERPL-MCNC: 0.57 MG/DL (ref 0.2–1)
BILIRUB UR QL STRIP: NEGATIVE
BUN SERPL-MCNC: 12 MG/DL (ref 5–25)
CALCIUM SERPL-MCNC: 8.9 MG/DL (ref 8.3–10.1)
CHLORIDE SERPL-SCNC: 109 MMOL/L (ref 96–108)
CHOLEST SERPL-MCNC: 166 MG/DL
CLARITY UR: CLEAR
CO2 SERPL-SCNC: 24 MMOL/L (ref 21–32)
COLOR UR: ABNORMAL
CREAT SERPL-MCNC: 0.74 MG/DL (ref 0.6–1.3)
EOSINOPHIL # BLD AUTO: 0.04 THOUSAND/ÂΜL (ref 0–0.61)
EOSINOPHIL NFR BLD AUTO: 1 % (ref 0–6)
ERYTHROCYTE [DISTWIDTH] IN BLOOD BY AUTOMATED COUNT: 11.6 % (ref 11.6–15.1)
FERRITIN SERPL-MCNC: 81 NG/ML (ref 8–388)
GFR SERPL CREATININE-BSD FRML MDRD: 108 ML/MIN/1.73SQ M
GLUCOSE P FAST SERPL-MCNC: 98 MG/DL (ref 65–99)
GLUCOSE UR STRIP-MCNC: NEGATIVE MG/DL
HAV AB SER QL IA: NORMAL
HBV SURFACE AB SER-ACNC: 34.57 MIU/ML
HCT VFR BLD AUTO: 40.9 % (ref 34.8–46.1)
HDLC SERPL-MCNC: 78 MG/DL
HGB BLD-MCNC: 13.4 G/DL (ref 11.5–15.4)
HGB UR QL STRIP.AUTO: NEGATIVE
IMM GRANULOCYTES # BLD AUTO: 0.01 THOUSAND/UL (ref 0–0.2)
IMM GRANULOCYTES NFR BLD AUTO: 0 % (ref 0–2)
IRON SATN MFR SERPL: 33 % (ref 15–50)
IRON SERPL-MCNC: 134 UG/DL (ref 50–170)
KETONES UR STRIP-MCNC: NEGATIVE MG/DL
LDLC SERPL CALC-MCNC: 68 MG/DL (ref 0–100)
LEUKOCYTE ESTERASE UR QL STRIP: ABNORMAL
LYMPHOCYTES # BLD AUTO: 1.38 THOUSANDS/ÂΜL (ref 0.6–4.47)
LYMPHOCYTES NFR BLD AUTO: 32 % (ref 14–44)
MCH RBC QN AUTO: 30.7 PG (ref 26.8–34.3)
MCHC RBC AUTO-ENTMCNC: 32.8 G/DL (ref 31.4–37.4)
MCV RBC AUTO: 94 FL (ref 82–98)
MONOCYTES # BLD AUTO: 0.35 THOUSAND/ÂΜL (ref 0.17–1.22)
MONOCYTES NFR BLD AUTO: 8 % (ref 4–12)
NEUTROPHILS # BLD AUTO: 2.47 THOUSANDS/ÂΜL (ref 1.85–7.62)
NEUTS SEG NFR BLD AUTO: 58 % (ref 43–75)
NITRITE UR QL STRIP: NEGATIVE
NON-SQ EPI CELLS URNS QL MICRO: ABNORMAL /HPF
NRBC BLD AUTO-RTO: 0 /100 WBCS
PH UR STRIP.AUTO: 6 [PH]
PLATELET # BLD AUTO: 200 THOUSANDS/UL (ref 149–390)
PMV BLD AUTO: 10.1 FL (ref 8.9–12.7)
POTASSIUM SERPL-SCNC: 3.9 MMOL/L (ref 3.5–5.3)
PROT SERPL-MCNC: 6.9 G/DL (ref 6.4–8.4)
PROT UR STRIP-MCNC: NEGATIVE MG/DL
RBC # BLD AUTO: 4.37 MILLION/UL (ref 3.81–5.12)
RBC #/AREA URNS AUTO: ABNORMAL /HPF
SODIUM SERPL-SCNC: 137 MMOL/L (ref 135–147)
SP GR UR STRIP.AUTO: 1.01 (ref 1–1.03)
TIBC SERPL-MCNC: 411 UG/DL (ref 250–450)
TRIGL SERPL-MCNC: 100 MG/DL
TSH SERPL DL<=0.05 MIU/L-ACNC: 3.22 UIU/ML (ref 0.45–4.5)
UROBILINOGEN UR STRIP-ACNC: <2 MG/DL
VIT B12 SERPL-MCNC: 421 PG/ML (ref 100–900)
WBC # BLD AUTO: 4.29 THOUSAND/UL (ref 4.31–10.16)
WBC #/AREA URNS AUTO: ABNORMAL /HPF

## 2023-01-28 LAB
MEV IGG SER IA-ACNC: 46 AU/ML
MUV IGG SER IA-ACNC: 140 AU/ML
RUBV IGG SERPL IA-ACNC: 4.82 INDEX
TESTOST FREE SERPL-MCNC: 0.9 PG/ML (ref 0–4.2)
TESTOST SERPL-MCNC: 18 NG/DL (ref 8–60)

## 2023-01-30 LAB — VIT B6 SERPL-MCNC: 19.9 UG/L (ref 3.4–65.2)

## 2023-01-31 LAB
VIT B1 BLD-SCNC: 100.2 NMOL/L (ref 66.5–200)
ZINC SERPL-MCNC: 72 UG/DL (ref 44–115)

## 2023-02-01 LAB — VIT C SERPL-MCNC: 1.3 MG/DL (ref 0.4–2)

## 2023-02-02 ENCOUNTER — TELEPHONE (OUTPATIENT)
Dept: FAMILY MEDICINE CLINIC | Facility: CLINIC | Age: 33
End: 2023-02-02

## 2023-02-02 NOTE — TELEPHONE ENCOUNTER
Pt called and left msg on machine stating that she was told after her blood work to call and speak to dr Jailyn Fuentes regarding getting a plan together ,, please call pt

## 2023-02-03 ENCOUNTER — TELEPHONE (OUTPATIENT)
Dept: FAMILY MEDICINE CLINIC | Facility: CLINIC | Age: 33
End: 2023-02-03

## 2023-02-03 LAB
A-TOCOPHEROL VIT E SERPL-MCNC: 7.8 MG/L (ref 5.9–19.4)
GAMMA TOCOPHEROL SERPL-MCNC: 0.5 MG/L (ref 0.7–4.9)
VIT A SERPL-MCNC: 54.7 UG/DL (ref 18.9–57.3)

## 2023-02-03 NOTE — TELEPHONE ENCOUNTER
----- Message from Ty Vizcarra MD sent at 2/2/2023  5:54 PM EST -----  Please let pt know her blood work show she is deficient in vitamin b6, b12, d  She is not immune to hepatitis A infection  Recommend to check w insurance for hep a vaccine coverage  We can give her in office w nurse visit=series of 2=6 months apart  Recommend to take vitamin b6 100 mg, b12 2000 mcg, vitamin d3 2000 units with calcium 600 mg daily    Her testosterone level, sugar, thyroid, cholesterol, iron are all normal  She can fu w dermatology to assist w acne and excision biopsy of the mole on her abdomen

## 2023-02-03 NOTE — TELEPHONE ENCOUNTER
Patient returned call, gave results, medication recommendations and referral information as well    Vijaya Nguyen

## 2023-06-13 ENCOUNTER — TELEPHONE (OUTPATIENT)
Dept: FAMILY MEDICINE CLINIC | Facility: CLINIC | Age: 33
End: 2023-06-13

## 2023-10-25 ENCOUNTER — TELEPHONE (OUTPATIENT)
Age: 33
End: 2023-10-25

## 2023-10-25 NOTE — TELEPHONE ENCOUNTER
I received a Care Request from patient to schedule for:    Where Does it Hurt? Are you considering joint replacement? No   Are you seeking a second opinion? No  If yes, who is your doctor?      Mailbox is full

## 2024-02-17 DIAGNOSIS — Z30.41 SURVEILLANCE FOR BIRTH CONTROL, ORAL CONTRACEPTIVES: ICD-10-CM

## 2024-02-19 RX ORDER — DROSPIRENONE, ETHINYL ESTRADIOL AND LEVOMEFOLATE CALCIUM AND LEVOMEFOLATE CALCIUM 3-0.02(24)
1 KIT ORAL DAILY
Qty: 84 TABLET | Refills: 0 | Status: SHIPPED | OUTPATIENT
Start: 2024-02-19

## 2024-02-21 PROBLEM — Z01.419 ENCOUNTER FOR GYNECOLOGICAL EXAMINATION (GENERAL) (ROUTINE) WITHOUT ABNORMAL FINDINGS: Status: RESOLVED | Noted: 2023-01-25 | Resolved: 2024-02-21

## 2024-05-09 DIAGNOSIS — Z30.41 SURVEILLANCE FOR BIRTH CONTROL, ORAL CONTRACEPTIVES: ICD-10-CM

## 2024-05-13 RX ORDER — DROSPIRENONE, ETHINYL ESTRADIOL AND LEVOMEFOLATE CALCIUM AND LEVOMEFOLATE CALCIUM 3-0.02(24)
1 KIT ORAL DAILY
Qty: 84 TABLET | Refills: 0 | Status: SHIPPED | OUTPATIENT
Start: 2024-05-13

## 2024-08-06 NOTE — PROGRESS NOTES
Assessment & Plan   Diagnoses and all orders for this visit:    Encounter for gynecological examination (general) (routine) without abnormal findings  -     IGP, Aptima HPV, Rfx 16/18,45  The current ASCCP guidelines were reviewed. Patient's last pap was 1/27/21 - WNL and therefore, a pap with HPV cotesting is indicated at this time. I emphasized the importance of an annual pelvic and breast exam. Patient ok to have a pap done today.    Encounter for preconception consultation  The patient’s past history and current medications were reviewed. Encourage at least a MVI q day and r/ulisses importance of folic acid; When actively trying can switch to a PNV + DHA daily. Significant other can be on a MVI daily as well. Reviewed how to track cycles, approximate time of ovulation and most fertile times of the cycle to try for conception. Avoid saliva and lubricants. Varicella - had as a child; She will verify that her vaccinations and blood work are up-to-date. Cats - none; Reviewed genetic carrier screening and offered to assess for hemoglobinopathies, CF, and SMA - declines at this time and will consider in first trimester. She was advised to call if she does not conceive in one year.  All questions have been answered to her satisfaction. Patient to call with any further questions/concerns.    Discussion  I have discussed the importance of monthly self-breast exams, exercise and healthy diet as well as adequate intake of calcium and vitamin D. Encourage MVI q day and r/ulisses importance of folic acid; Encourage 30-40 min weight bearing exercise most days of week  Encourage safe sexual practices; STI testing - declines  Contraception - none - open to preg  Breast cancer screening is not indicated at this time  The patient has not had the Gardasil vaccine series, which is recommended for patients from 9-45 years of age. Declined in the past.  All questions have been answered to her satisfaction  RTO for APE or sooner if  needed      Subjective     HPI   Isaura Ballesteros is a 33 y.o. female who presents for annual well woman exam.   Menarche - 12; LMP - 7/22/24; Periods are reg q month and last 5-6 days; No excessive bleeding; No intermenstrual bleeding or spotting; Cramps are tolerable.  No vulvar itch/burn; No vaginal itch/burn; No abn discharge or odor; No urinary sx - burning/pain/frequency/hematuria  (+) SBEs - no breast masses, asymmetry, nipple discharge or bleeding, changes in skin of breast, or breast tenderness bilaterally  No abd/pelvic pain or HAs; Felt a slight discomfort for 2 nights the other week - reports history of ovarian cyst in the past; will continue to watch but will call if persists or worsens.  Pt is sexually active in a mutually monog/ sexual relationship; No issues with intercourse; She declines sti/hiv/hep testing; Feels safe at home  Current contraception: none - open to preg; was on Beyaz but d/c in June - starting to think about family planning; had been on throughout her 20s;  Gardasil - declined in the past  (+) PCP for routine Bw/care;    Last Pap - 1/27/21 - WNL   History of abnormal Pap smear: 2017 LSIL (+) HRHPV colpo benign     Review of Systems   Constitutional:  Negative for activity change, fatigue, fever and unexpected weight change.   HENT:  Negative for congestion, dental problem, sinus pressure and sinus pain.    Eyes:  Negative for visual disturbance.   Respiratory:  Negative for cough, shortness of breath and wheezing.    Cardiovascular:  Negative for chest pain and leg swelling.   Gastrointestinal:  Negative for abdominal distention, abdominal pain, blood in stool, constipation, diarrhea, nausea and vomiting.   Endocrine: Negative for polydipsia.   Genitourinary:  Negative for difficulty urinating, dyspareunia, dysuria, frequency, hematuria, menstrual problem, pelvic pain, urgency, vaginal bleeding, vaginal discharge and vaginal pain.   Musculoskeletal:  Negative for  arthralgias and back pain.   Allergic/Immunologic: Negative for environmental allergies.   Neurological:  Negative for dizziness, seizures and headaches.   Psychiatric/Behavioral:  Negative for dysphoric mood and sleep disturbance. The patient is not nervous/anxious.        The following portions of the patient's history were reviewed and updated as appropriate: allergies, current medications, past family history, past medical history, past social history, past surgical history, and problem list.         OB History          0    Para   0    Term   0       0    AB   0    Living   0         SAB   0    IAB   0    Ectopic   0    Multiple   0    Live Births   0                 Past Medical History:   Diagnosis Date    Acne vulgaris 2021    Asthma     Diarrhea 2021    Encounter for long-term current use of medication 2021    Irregular menses 2021    Pigmented nevus 2021    Abdomen 15x7 mm Lower right back 10x8 mm Upper left back=2 nevi       Past Surgical History:   Procedure Laterality Date    APPENDECTOMY      MOLE REMOVAL      2 - both benign       Family History   Problem Relation Age of Onset    Parkinsonism Mother     No Known Problems Father        Social History     Socioeconomic History    Marital status: Unknown     Spouse name: Not on file    Number of children: Not on file    Years of education: Not on file    Highest education level: Not on file   Occupational History    Not on file   Tobacco Use    Smoking status: Never    Smokeless tobacco: Never   Vaping Use    Vaping status: Never Used   Substance and Sexual Activity    Alcohol use: Yes     Alcohol/week: 12.0 standard drinks of alcohol     Types: 8 Glasses of wine, 4 Standard drinks or equivalent per week     Comment: socially    Drug use: Never    Sexual activity: Yes     Partners: Male     Birth control/protection: OCP   Other Topics Concern    Not on file   Social History Narrative    Not on file     Social  "Determinants of Health     Financial Resource Strain: Not on file   Food Insecurity: Not on file   Transportation Needs: Not on file   Physical Activity: Not on file   Stress: Not on file   Social Connections: Not on file   Intimate Partner Violence: Not on file   Housing Stability: Not on file         Current Outpatient Medications:     Multiple Vitamin (MULTI VITAMIN PO), Take by mouth, Disp: , Rfl:     COLLAGEN PO, Take by mouth (Patient not taking: Reported on 8/8/2024), Disp: , Rfl:     Drospiren-Eth Estrad-Levomefol 3-0.02-0.451 MG TABS, TAKE 1 TABLET BY MOUTH EVERY DAY (Patient not taking: Reported on 8/8/2024), Disp: 84 tablet, Rfl: 0    Allergies   Allergen Reactions    Lactose - Food Allergy Other (See Comments)     Lactose intol       Objective   Vitals:    08/08/24 0936   BP: 110/62   BP Location: Left arm   Patient Position: Sitting   Cuff Size: Standard   Weight: 67.3 kg (148 lb 6.4 oz)   Height: 5' 7\" (1.702 m)     Physical Exam  Vitals reviewed.   Constitutional:       General: She is awake. She is not in acute distress.     Appearance: Normal appearance. She is well-developed and well-groomed. She is not ill-appearing, toxic-appearing or diaphoretic.   HENT:      Head: Normocephalic and atraumatic.   Eyes:      Conjunctiva/sclera: Conjunctivae normal.   Neck:      Thyroid: No thyroid mass, thyromegaly or thyroid tenderness.   Cardiovascular:      Rate and Rhythm: Normal rate and regular rhythm.      Heart sounds: Normal heart sounds. No murmur heard.  Pulmonary:      Effort: Pulmonary effort is normal. No tachypnea, bradypnea or respiratory distress.      Breath sounds: Normal breath sounds. No stridor or decreased air movement. No wheezing.   Chest:   Breasts:     Breasts are symmetrical.      Right: Normal. No swelling, bleeding, inverted nipple, mass, nipple discharge, skin change or tenderness.      Left: Normal. No swelling, bleeding, inverted nipple, mass, nipple discharge, skin change or " tenderness.   Abdominal:      General: There is no distension.      Palpations: Abdomen is soft. There is no hepatomegaly, splenomegaly or mass.      Tenderness: There is no abdominal tenderness.      Hernia: No hernia is present. There is no hernia in the left inguinal area or right inguinal area.   Genitourinary:     General: Normal vulva.      Exam position: Supine.      Pubic Area: No rash or pubic lice.       Labia:         Right: No rash, tenderness, lesion or injury.         Left: No rash, tenderness, lesion or injury.       Urethra: No prolapse, urethral pain, urethral swelling or urethral lesion.      Vagina: Normal. No signs of injury and foreign body. No vaginal discharge, erythema, tenderness, bleeding, lesions or prolapsed vaginal walls.      Cervix: No cervical motion tenderness, discharge, friability, lesion, erythema or cervical bleeding.      Uterus: Not deviated, not enlarged, not fixed, not tender and no uterine prolapse.       Adnexa:         Right: No mass, tenderness or fullness.          Left: No mass, tenderness or fullness.     Lymphadenopathy:      Cervical: No cervical adenopathy.      Upper Body:      Right upper body: No supraclavicular or axillary adenopathy.      Left upper body: No supraclavicular or axillary adenopathy.      Lower Body: No right inguinal adenopathy. No left inguinal adenopathy.   Skin:     General: Skin is warm and dry.   Neurological:      Mental Status: She is alert and oriented to person, place, and time.   Psychiatric:         Mood and Affect: Mood and affect normal.         Speech: Speech normal.         Behavior: Behavior normal. Behavior is cooperative.         Thought Content: Thought content normal.         Judgment: Judgment normal.

## 2024-08-07 DIAGNOSIS — Z30.41 SURVEILLANCE FOR BIRTH CONTROL, ORAL CONTRACEPTIVES: ICD-10-CM

## 2024-08-08 ENCOUNTER — ANNUAL EXAM (OUTPATIENT)
Dept: OBGYN CLINIC | Facility: CLINIC | Age: 34
End: 2024-08-08
Payer: COMMERCIAL

## 2024-08-08 ENCOUNTER — TELEPHONE (OUTPATIENT)
Dept: OBGYN CLINIC | Facility: HOSPITAL | Age: 34
End: 2024-08-08

## 2024-08-08 VITALS
WEIGHT: 148.4 LBS | HEIGHT: 67 IN | SYSTOLIC BLOOD PRESSURE: 110 MMHG | DIASTOLIC BLOOD PRESSURE: 62 MMHG | BODY MASS INDEX: 23.29 KG/M2

## 2024-08-08 DIAGNOSIS — Z31.69 ENCOUNTER FOR PRECONCEPTION CONSULTATION: ICD-10-CM

## 2024-08-08 DIAGNOSIS — Z01.419 ENCOUNTER FOR GYNECOLOGICAL EXAMINATION (GENERAL) (ROUTINE) WITHOUT ABNORMAL FINDINGS: Primary | ICD-10-CM

## 2024-08-08 PROCEDURE — 99395 PREV VISIT EST AGE 18-39: CPT | Performed by: PHYSICIAN ASSISTANT

## 2024-08-08 RX ORDER — DROSPIRENONE, ETHINYL ESTRADIOL AND LEVOMEFOLATE CALCIUM AND LEVOMEFOLATE CALCIUM 3-0.02(24)
1 KIT ORAL DAILY
Qty: 84 TABLET | Refills: 0 | OUTPATIENT
Start: 2024-08-08

## 2024-08-08 NOTE — TELEPHONE ENCOUNTER
LM for pt to reschedule her new patient appointment on 8/21 due to Dr. Sue being OOO. Advised pt that appointment would be canceled so they do not get reminder calls. Provider call back number. When pt calls please reschedule to the next new patient appointment with Dr. Sue.

## 2024-08-15 ENCOUNTER — TELEPHONE (OUTPATIENT)
Dept: OBGYN CLINIC | Facility: CLINIC | Age: 34
End: 2024-08-15

## 2024-08-15 LAB
CYTOLOGIST CVX/VAG CYTO: ABNORMAL
DX ICD CODE: ABNORMAL
HPV GENOTYPE REFLEX: ABNORMAL
HPV I/H RISK 4 DNA CVX QL PROBE+SIG AMP: POSITIVE
HPV16 DNA CVX QL PROBE+SIG AMP: NEGATIVE
HPV18+45 E6+E7 MRNA CVX QL NAA+PROBE: NEGATIVE
OTHER STN SPEC: ABNORMAL
PATH REPORT.FINAL DX SPEC: ABNORMAL
SL AMB NOTE:: ABNORMAL
SL AMB SPECIMEN ADEQUACY: ABNORMAL
SL AMB TEST METHODOLOGY: ABNORMAL

## 2024-08-15 NOTE — TELEPHONE ENCOUNTER
Lmom for pt to call us back to go over results of pap , and to make sure yearly is scheduled for repap

## 2024-08-20 NOTE — TELEPHONE ENCOUNTER
Results of pap seen and went over with pt . No further questions at this time . Pt Scheduled for next year  for APE and pap with co-testing .

## 2024-09-23 ENCOUNTER — NURSE TRIAGE (OUTPATIENT)
Age: 34
End: 2024-09-23

## 2024-09-23 NOTE — TELEPHONE ENCOUNTER
Patient calling in returning phone call. Pt was notified of Dr. Ellington's recommendations, Pt verbalized understanding, no further questions at this time

## 2024-09-23 NOTE — TELEPHONE ENCOUNTER
"Patient is about 6 weeks pregnant according to LMP of 8/12/24 called office c/o abdominal cramping that started about a week and a half ago. She states this feels like period cramps. The pain comes and goes and is worse when laying down and at night. She would like to know what she can take as far as medications for the pain or what she can do for the pain. She rates the pain a 3.  She states she take Tylenol - this helps some and when she has a bowel movement this helps sometimes.  Advised she stay hydrated and drink a lot of fluid, also advised Tylenol is the only approved over the counter medication for pain, but will reach out to provider for other recommendations. Advised she call back if she has vaginal bleeding, worsening abdominal pain, her abdominal cramping becomes more moderate to severe and is more constant. Advised will reach out to provider for further advice at this time.   Routing to Dr. Ellington.           Reason for Disposition   MILD abdominal pain (e.g., doesn't interfere with normal activities)    Answer Assessment - Initial Assessment Questions  1. LOCATION: \"Where does it hurt?\"       Lower abdomen / pelvic area   2. RADIATION: \"Does the pain shoot anywhere else?\" (e.g., chest, back, shoulder)      No   3. ONSET: \"When did the pain begin?\" (e.g., minutes, hours or days ago)       Week and a half ago   4. ONSET: \"Gradual or sudden onset?\"      Gradual   5. PATTERN \"Does the pain come and go, or has it been constant since it started?\"       Comes and goes   6. SEVERITY: \"How bad is the pain?\" \"What does it keep you from doing?\"  (e.g., Scale 1-10; mild, moderate, or severe)    - MILD (1-3): doesn't interfere with normal activities, abdomen soft and not tender to touch     - MODERATE (4-7): interferes with normal activities or awakens from sleep, tender to touch     - SEVERE (8-10): excruciating pain, doubled over, unable to do any normal activities      3  7. RECURRENT SYMPTOM: \"Have you ever had " "this type of stomach pain before?\" If Yes, ask: \"When was the last time?\" and \"What happened that time?\"       Yes with period   8. CAUSE: \"What do you think is causing the stomach pain?\"      Unknown   9. RELIEVING/AGGRAVATING FACTORS: \"What makes it better or worse?\" (e.g., antacids, bowel movement, movement)      Worse - laying down. Takes Tylenol - helps.   Having a BM makes it better.    10. OTHER SYMPTOMS: \"Has there been any vaginal bleeding, fever, vomiting, diarrhea, or urine problems?\"        No   11. HAMMAD: \"What date are you expecting to deliver?\"        LMP: 8/12/24 - 6 weeks    Protocols used: Pregnancy - Abdominal Pain Less Than 20 Weeks EGA-ADULT-OH    "

## 2024-10-09 NOTE — PROGRESS NOTES
Assessment & Plan   Diagnoses and all orders for this visit:    Amenorrhea  -     AMB US OB < 14 weeks single or first gestation level 1    9 weeks gestation of pregnancy  -     Ambulatory Referral to Maternal Fetal Medicine; Future    Discussion  Viable IUP at  9w1d - HAMMAD established to 25 based on LMP  Referral placed for MFM to schedule early anatomy scan and review options for genetic screening  RTO in 2 weeks for OB INTAKE with OB nurse navigator  RTO in 4 weeks for INITIAL PRENATAL - routine ob check with physical exam or sooner if needed    Subjective     HPI  33 y.o.  who presents with amenorrhea. LMP is an exact date of 24. This makes her 9w1d corresponding to an HAMMAD of 25.   Periods are usually reg q month.   This is a planned and welcomed pregnancy.  (+) HA - mild; (+) cramping - first couple weeks and has subsided; Denies n/v/vb/lof/edema/dv/smoking; urine neg/neg  PNVs + DHA - tolerating daily; r/ulisses 1 mg folic acid and DHA daily    TV us done - single viable IUP measuring mm by CRL at wd; (+) FHM of bpm; HAMMAD will be based on     Ultrasound Probe Disinfection    A transvaginal ultrasound was performed.   Probe identification: probe serial number [facility]: [id number]  Disinfection process: Disinfection was performed with High Level Disinfection Process ([employer])    Giovanny Ennis PA-C  10/15/24  9:07 AM    OB History    Para Term  AB Living   1 0 0 0 0 0   SAB IAB Ectopic Multiple Live Births   0 0 0 0 0      # Outcome Date GA Lbr Judah/2nd Weight Sex Type Anes PTL Lv   1 Current                Past Medical History:   Diagnosis Date    Acne vulgaris 2021    Asthma     Diarrhea 2021    Encounter for long-term current use of medication 2021    Irregular menses 2021    Pigmented nevus 2021    Abdomen 15x7 mm Lower right back 10x8 mm Upper left back=2 nevi         Current Outpatient Medications:     Prenatal Vit-Fe Fumarate-FA (PRENATAL PO), Take  by mouth, Disp: , Rfl:     Allergies   Allergen Reactions    Lactose - Food Allergy Other (See Comments)     Lactose intol       Objective   Vitals:    10/15/24 0828   BP: 110/66   BP Location: Left arm   Patient Position: Sitting   Cuff Size: Standard   Weight: 67.6 kg (149 lb)     Physical Exam  Vitals reviewed.   Constitutional:       General: She is not in acute distress.     Appearance: Normal appearance. She is not ill-appearing, toxic-appearing or diaphoretic.   HENT:      Head: Normocephalic and atraumatic.   Eyes:      Conjunctiva/sclera: Conjunctivae normal.   Neurological:      Mental Status: She is alert and oriented to person, place, and time.   Psychiatric:         Mood and Affect: Mood normal.         Behavior: Behavior normal.         Thought Content: Thought content normal.         Judgment: Judgment normal.

## 2024-10-15 ENCOUNTER — ULTRASOUND (OUTPATIENT)
Dept: OBGYN CLINIC | Facility: CLINIC | Age: 34
End: 2024-10-15
Payer: COMMERCIAL

## 2024-10-15 ENCOUNTER — TELEPHONE (OUTPATIENT)
Age: 34
End: 2024-10-15

## 2024-10-15 VITALS — WEIGHT: 149 LBS | BODY MASS INDEX: 23.34 KG/M2 | SYSTOLIC BLOOD PRESSURE: 110 MMHG | DIASTOLIC BLOOD PRESSURE: 66 MMHG

## 2024-10-15 DIAGNOSIS — N91.2 AMENORRHEA: Primary | ICD-10-CM

## 2024-10-15 DIAGNOSIS — Z3A.09 9 WEEKS GESTATION OF PREGNANCY: ICD-10-CM

## 2024-10-15 PROCEDURE — 76817 TRANSVAGINAL US OBSTETRIC: CPT | Performed by: PHYSICIAN ASSISTANT

## 2024-10-15 PROCEDURE — 99214 OFFICE O/P EST MOD 30 MIN: CPT | Performed by: PHYSICIAN ASSISTANT

## 2024-10-25 ENCOUNTER — INITIAL PRENATAL (OUTPATIENT)
Dept: OBGYN CLINIC | Facility: CLINIC | Age: 34
End: 2024-10-25

## 2024-10-25 VITALS
BODY MASS INDEX: 23.57 KG/M2 | SYSTOLIC BLOOD PRESSURE: 108 MMHG | DIASTOLIC BLOOD PRESSURE: 60 MMHG | WEIGHT: 150.2 LBS | HEIGHT: 67 IN

## 2024-10-25 DIAGNOSIS — Z31.430 ENCOUNTER OF FEMALE FOR TESTING FOR GENETIC DISEASE CARRIER STATUS FOR PROCREATIVE MANAGEMENT: Primary | ICD-10-CM

## 2024-10-25 DIAGNOSIS — D56.9 THALASSEMIA, UNSPECIFIED TYPE: ICD-10-CM

## 2024-10-25 DIAGNOSIS — Z36.89 ENCOUNTER FOR OTHER SPECIFIED ANTENATAL SCREENING: ICD-10-CM

## 2024-10-25 DIAGNOSIS — Z34.01 ENCOUNTER FOR SUPERVISION OF NORMAL FIRST PREGNANCY IN FIRST TRIMESTER: ICD-10-CM

## 2024-10-25 PROCEDURE — OBC: Performed by: PHYSICIAN ASSISTANT

## 2024-10-25 NOTE — PROGRESS NOTES
OB INTAKE INTERVIEW  Patient is 33 y.o. who presents for OB intake at 10 wks 4 days  She is accompanied by herself during this encounter  The father of her baby (Tyler Mobley) is involved in the pregnancy and is 32 years old.      Last Menstrual Period: 2024  Ultrasound: Measured 9 weeks 2 days on 10/15/2024  Estimated Date of Delivery: 2025 confirmed by US    Signs/Symptoms of Pregnancy  Current pregnancy symptoms: breast tenderness, sl urinary frequency, fatigue  Constipation no  Headaches no  Cramping/spotting no  PICA cravings no    Diabetes-  There is no height or weight on file to calculate BMI.  If patient has 1 or more, please order early 1 hour GTT  History of GDM no  BMI >35 no  History of PCOS or current metformin use no  History of LGA/macrosomic infant (4000g/9lbs) no    If patient has 2 or more, please order early 1 hour GTT  BMI>30 no  AMA no  First degree relative with type 2 diabetes no  History of chronic HTN, hyperlipidemia, elevated A1C no  High risk race (, , ,  or ) no    Hypertension- if you answer yes to any of the following, please order baseline preeclampsia labs (cbc, comprehensive metabolic panel, urine protein creatinine ratio, uric acid)  History of of chronic HTN no  History of gestational HTN no  History of preeclampsia, eclampsia, or HELLP syndrome no  History of diabetes no  History of lupus,sjogrens syndrome, kidney disease no    Thyroid- if yes order TSH with reflex T4  History of thyroid disease no    Bleeding Disorder or Hx of DVT-patient or first degree relative with history of. Order the following if not done previously.   (Factor V, antithrombin III, prothrombin gene mutation, protein C and S Ag, lupus anticoagulant, anticardiolipin, beta-2 glycoprotein)   no    OB/GYN-  History of abnormal pap smear YES       Date of last pap smear 2024 (wnl, (+) hpv (-) type 16 &18 - prev LGSIL with (+) hpv with  (-) colposcopy pathology  History of HPV YES  History of Herpes/HSV no  History of other STI (gonorrhea, chlamydia, trich) no  History of prior  no  History of prior  no  History of  delivery prior to 36 weeks 6 days no  History of Varicella or Vaccination Patient had chickenpox in [place]  History of blood transfusion no  Ok for blood transfusion YES    Substance screening-   History of tobacco use no  Currently using tobacco no  Substance Use Screen Level (N/A, LOW, HIGH) N/A    MRSA Screening-   Does the pt have a hx of MRSA? no    Immunizations:  Influenza vaccine given this season NO - recommended in pregnancy  Discussed Tdap vaccine YES  Discussed COVID Vaccine - patient had Covid vaccine x 2 _ 1 booster, patient had Covid x 2    Genetic/MFM-  Do you or your partner have a history of any of the following in yourselves or first degree relatives?  Cystic fibrosis no  Spinal muscular atrophy no  Hemoglobinopathy/Sickle Cell/Thalassemia no  Fragile X Intellectual Disability no    If yes, discuss Carrier Screening and recommend consultation with MFM/Genetic Counseling and place specific [facility] Referral for.    If no, discuss Carrier Screening being completed once in a lifetime as a standard of care lab test. Place orders for Cystic Fibrosis Gene Test (EXA733) and Spinal Muscular Atrophy DNA ([id number])      Appointment for Nuchal Translucency Ultrasound at [facility] scheduled for 2024 - dicussed NIPT/MSAFP      Interview education  [facility] Pregnancy Essentials Book reviewed, discussed and attached to their AVS YES    Nurse/Family Partnership- patient may qualify; referral placed NO    Prenatal lab work scripts YES ([facility])  Extra labs ordered:  Hgb fractionation cascade, CF/SMA carrier screening    Aspirin/Preeclampsia Screen    Risk Level Risk Factor Recommendation   LOW Prior Uncomplicated full-term delivery no No Aspirin recommendation        MODERATE Nulliparity YES Recommend low-dose  aspirin if     BMI>30 no 2 or more moderate risk factors    Family History Preeclampsia (mother/sister) no     35yr old or greater no     Black Race, Concern for SDOH/Low Socioeconomic no     IVF Pregnancy  no     Personal History Risks (low birth weight, prior adverse preg outcome, >10yr preg interval) no         HIGH History of Preeclampsia no Recommend low-dose aspirin if     Multifetal gestation no 1 or more high risk factors    Chronic HTN no     Type 1 or 2 Diabetes no     Renal Disease no     Autoimmune Disease  no      Contraindications to ASA therapy:  NSAID/ ASA allergy: no  Nasal polyps: no  Asthma with history of ASA induced bronchospasm: no  Relative contraindications:  History of GI bleed: no  Active peptic ulcer disease: no  Severe hepatic dysfunction: no    Patient should be recommended to take ASA 162mg during this pregnancy from 12-36wks to lower her risk of preeclampsia: LOW RISK per screening protocol      The patient has a history now or in prior pregnancy notable for:    - hx LGSIL (+) HPV (2017) - had colposcopy with (-) pathology    Details that I feel provider should be aware of:      - recommended q 6 month dental cleanings - last dental cleaning approx 1 yr ago  - 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 The patient was oriented to our practice, the navigator role, reviewed delivering physicians and [facility] for Delivery. All questions were answered.    Interviewed by: SARITHA Tapia RN

## 2024-10-25 NOTE — PATIENT INSTRUCTIONS
Congratulations!! Please review our Pregnancy Essential Guide and [facility] L&D Virtual tour from our networks website.     [facility] Pregnancy Essentials Guide  [facility] ([email or web address])     [facility] - Virtual Tour ([employer])        Mike Isaura,    It was so nice getting to know you today. Remember if you have an urgent or time sensitive concern, please call the practice phone number so a clinical triage team member can review your symptoms and get in touch with our on call provider if necessary. If you have general questions or need help navigating our services please REPLY to this message so it comes directly to me and I will respond between other patients. If I am out of the office for any reason, another nurse navigator may reach out to help you. Our Pregnancy Essential Guide is a great online resource--please use the link below.     [facility] Pregnancy Essentials Guide  [facility] ([email or web address])     Again, Congratulations and Thank You for choosing [facility]. I look forward to helping you through this journey. Reach out-   Casandra LOPEZ RN

## 2024-11-08 NOTE — PROGRESS NOTES
OB/GYN  PN Visit  Isaura Ballesteros  [id number]  2024  11:54 AM  Oanh Angeles PA-C    S: 33 y.o.  12w4d here for PN visit. Pregnancy uncomplicated.     OB Complaints:  Denies c/o n/v/ha, no edema, no smoking, no DV.   No vb/lof  No cramping/ctxns or signs of PTL.    She reports overall she is feeling well. Some food aversions.   She does note SOB w activity. Reviewed normal physiological changes that occur in pregnancy but advised if any acute SOB, CP, leg swelling, etc to ED for evaluation as clotting is a risk factor in pregnancy as well.   Established care with MFM tomorrow.      O:    Pre- Vitals      Flowsheet Row Most Recent Value   Prenatal Assessment    Fetal Heart Rate 154   Fundal Height (cm) 14 cm   Movement Absent   Prenatal Vitals    Blood Pressure 110/70   Weight - Scale 68.3 kg (150 lb 9.6 oz)   Urine Albumin/Glucose    Dilation/Effacement/Station    Vaginal Drainage    Draining Fluid No   Edema    LLE Edema None   RLE Edema None   Facial Edema None              Gen: no acute distress, nonlabored breathing.  OB exam completed: fundal height, +FHT  Urine: -/-    A/P:  #1. 12w4d GESTATION  Physical exam and pap deferred today. Done 2024 Last pap: WNL + HPV. Cx done today.   Flu: will plan to today        RTC in 4 weeks

## 2024-11-11 ENCOUNTER — INITIAL PRENATAL (OUTPATIENT)
Dept: OBGYN CLINIC | Facility: CLINIC | Age: 34
End: 2024-11-11

## 2024-11-11 VITALS
BODY MASS INDEX: 23.64 KG/M2 | SYSTOLIC BLOOD PRESSURE: 110 MMHG | DIASTOLIC BLOOD PRESSURE: 70 MMHG | HEIGHT: 67 IN | WEIGHT: 150.6 LBS

## 2024-11-11 DIAGNOSIS — Z3A.13 13 WEEKS GESTATION OF PREGNANCY: ICD-10-CM

## 2024-11-11 DIAGNOSIS — Z34.92 SECOND TRIMESTER PREGNANCY: Primary | ICD-10-CM

## 2024-11-11 PROCEDURE — PNV: Performed by: PHYSICIAN ASSISTANT

## 2024-11-11 NOTE — ASSESSMENT & PLAN NOTE
Overview:     Labs UTD  Pap smear:  WNL + HPV     GC/CT: 24   Prenatal Panel: not yet done  Blood Type:  RhoGam  indicated   GBS: plan at 36 wks    Genetic Testing: plans NIPS, has appt 24 w MFM    Vaccines:  Tdap: will review at 27+ wks  Flu: done 24    Birth Plan:   Yellow packet given and consents to be signed at 30 weeks.   Feeding Plan:   Breast pump:   Pediatrician:   Contraception:   Ultrasounds: plans MFM tomorrow 24

## 2024-11-12 ENCOUNTER — ROUTINE PRENATAL (OUTPATIENT)
Facility: HOSPITAL | Age: 34
End: 2024-11-12
Payer: COMMERCIAL

## 2024-11-12 VITALS
OXYGEN SATURATION: 99 % | SYSTOLIC BLOOD PRESSURE: 106 MMHG | DIASTOLIC BLOOD PRESSURE: 58 MMHG | HEART RATE: 74 BPM | HEIGHT: 67 IN | WEIGHT: 149.2 LBS | BODY MASS INDEX: 23.42 KG/M2

## 2024-11-12 DIAGNOSIS — Z36.82 ENCOUNTER FOR (NT) NUCHAL TRANSLUCENCY SCAN: ICD-10-CM

## 2024-11-12 DIAGNOSIS — Z3A.09 9 WEEKS GESTATION OF PREGNANCY: ICD-10-CM

## 2024-11-12 DIAGNOSIS — Z3A.13 13 WEEKS GESTATION OF PREGNANCY: Primary | ICD-10-CM

## 2024-11-12 PROCEDURE — 99203 OFFICE O/P NEW LOW 30 MIN: CPT | Performed by: OBSTETRICS & GYNECOLOGY

## 2024-11-12 PROCEDURE — 76813 OB US NUCHAL MEAS 1 GEST: CPT | Performed by: OBSTETRICS & GYNECOLOGY

## 2024-11-12 NOTE — LETTER
"2024    Giovanny Ennis PA-C  [address]  [place] [address]    Patient: Isaura Ballesteros   YOB: 1990   Date of Visit: 2024   Gestational age 13w1d   Nature of this communication: Routine - normal NT scan       Dear Giovanny Ennis,    This patient was seen recently in our  office.  The content of my evaluation today is in the ultrasound report under \"OB Procedures\" tab.     Please don't hesitate to contact our office with any concerns or questions.     Sincerely,      Aysha Daley MD  Attending Physician, Maternal-Fetal Medicine  [facility]      "

## 2024-11-12 NOTE — PROGRESS NOTES
"[facility]: Isaura Ballesteros was seen today for nuchal translucency ultrasound.  See ultrasound report under \"OB Procedures\" tab.   Please don't hesitate to contact our office with any concerns or questions.  -Aysha Daley MD    "

## 2024-11-13 ENCOUNTER — TELEPHONE (OUTPATIENT)
Age: 34
End: 2024-11-13

## 2024-11-13 NOTE — TELEPHONE ENCOUNTER
Patient called requesting nurse visit/ blood draw for NIPT. Pt completed Nuchal Transluceny 11/12 but needed to review with spouse before opting for test.   Attempted warm transfer to office.     Please reach out to pt to schedule.

## 2024-11-14 ENCOUNTER — APPOINTMENT (OUTPATIENT)
Dept: LAB | Facility: CLINIC | Age: 34
End: 2024-11-14
Payer: COMMERCIAL

## 2024-11-14 ENCOUNTER — CLINICAL SUPPORT (OUTPATIENT)
Facility: HOSPITAL | Age: 34
End: 2024-11-14
Payer: COMMERCIAL

## 2024-11-14 DIAGNOSIS — Z31.430 ENCOUNTER OF FEMALE FOR TESTING FOR GENETIC DISEASE CARRIER STATUS FOR PROCREATIVE MANAGEMENT: ICD-10-CM

## 2024-11-14 DIAGNOSIS — Z34.01 ENCOUNTER FOR SUPERVISION OF NORMAL FIRST PREGNANCY IN FIRST TRIMESTER: ICD-10-CM

## 2024-11-14 DIAGNOSIS — Z36.9 ENCOUNTER FOR ANTENATAL SCREENING, UNSPECIFIED: Primary | ICD-10-CM

## 2024-11-14 DIAGNOSIS — Z33.1 PREGNANT STATE, INCIDENTAL: ICD-10-CM

## 2024-11-14 DIAGNOSIS — Z36.89 ENCOUNTER FOR OTHER SPECIFIED ANTENATAL SCREENING: ICD-10-CM

## 2024-11-14 LAB
ABO GROUP BLD: NORMAL
BASOPHILS # BLD AUTO: 0.03 THOUSANDS/ÂΜL (ref 0–0.1)
BASOPHILS NFR BLD AUTO: 0 % (ref 0–1)
BLD GP AB SCN SERPL QL: NEGATIVE
EOSINOPHIL # BLD AUTO: 0.02 THOUSAND/ÂΜL (ref 0–0.61)
EOSINOPHIL NFR BLD AUTO: 0 % (ref 0–6)
ERYTHROCYTE [DISTWIDTH] IN BLOOD BY AUTOMATED COUNT: 12.4 % (ref 11.6–15.1)
HCT VFR BLD AUTO: 34.2 % (ref 34.8–46.1)
HGB BLD-MCNC: 11.4 G/DL (ref 11.5–15.4)
IMM GRANULOCYTES # BLD AUTO: 0.02 THOUSAND/UL (ref 0–0.2)
IMM GRANULOCYTES NFR BLD AUTO: 0 % (ref 0–2)
LYMPHOCYTES # BLD AUTO: 0.88 THOUSANDS/ÂΜL (ref 0.6–4.47)
LYMPHOCYTES NFR BLD AUTO: 11 % (ref 14–44)
MCH RBC QN AUTO: 30.4 PG (ref 26.8–34.3)
MCHC RBC AUTO-ENTMCNC: 33.3 G/DL (ref 31.4–37.4)
MCV RBC AUTO: 91 FL (ref 82–98)
MONOCYTES # BLD AUTO: 0.47 THOUSAND/ÂΜL (ref 0.17–1.22)
MONOCYTES NFR BLD AUTO: 6 % (ref 4–12)
NEUTROPHILS # BLD AUTO: 6.31 THOUSANDS/ÂΜL (ref 1.85–7.62)
NEUTS SEG NFR BLD AUTO: 83 % (ref 43–75)
NRBC BLD AUTO-RTO: 0 /100 WBCS
PLATELET # BLD AUTO: 212 THOUSANDS/UL (ref 149–390)
PMV BLD AUTO: 9.5 FL (ref 8.9–12.7)
RBC # BLD AUTO: 3.75 MILLION/UL (ref 3.81–5.12)
RH BLD: POSITIVE
RUBV IGG SERPL IA-ACNC: 62.1 IU/ML
SPECIMEN EXPIRATION DATE: NORMAL
WBC # BLD AUTO: 7.73 THOUSAND/UL (ref 4.31–10.16)

## 2024-11-14 PROCEDURE — 36415 COLL VENOUS BLD VENIPUNCTURE: CPT

## 2024-11-14 PROCEDURE — 86780 TREPONEMA PALLIDUM: CPT

## 2024-11-14 PROCEDURE — 85025 COMPLETE CBC W/AUTO DIFF WBC: CPT

## 2024-11-14 PROCEDURE — 83020 HEMOGLOBIN ELECTROPHORESIS: CPT

## 2024-11-14 PROCEDURE — 86762 RUBELLA ANTIBODY: CPT

## 2024-11-14 PROCEDURE — 86850 RBC ANTIBODY SCREEN: CPT

## 2024-11-14 PROCEDURE — 87340 HEPATITIS B SURFACE AG IA: CPT

## 2024-11-14 PROCEDURE — 86803 HEPATITIS C AB TEST: CPT

## 2024-11-14 PROCEDURE — 86900 BLOOD TYPING SEROLOGIC ABO: CPT

## 2024-11-14 PROCEDURE — 81220 CFTR GENE COM VARIANTS: CPT

## 2024-11-14 PROCEDURE — 87389 HIV-1 AG W/HIV-1&-2 AB AG IA: CPT

## 2024-11-14 PROCEDURE — 86901 BLOOD TYPING SEROLOGIC RH(D): CPT

## 2024-11-14 PROCEDURE — 81329 SMN1 GENE DOS/DELETION ALYS: CPT

## 2024-11-14 PROCEDURE — 36415 COLL VENOUS BLD VENIPUNCTURE: CPT | Performed by: OBSTETRICS & GYNECOLOGY

## 2024-11-14 NOTE — PROGRESS NOTES
Patient chose to have [facility] [id number] Non-Invasive Prenatal Screen [id number] PLUS w/ SCA, WITH fetal sex.  Patient choose to be billed through insurance.     Patient given brochure and is aware [facility] will contact patient's insurance and coordinate coverage.  Provided [facility] contact information. General inquiries [phone], Cost estimates [phone] and [facility] Billing [phone]. Website [email or web address].     Blood collection tubes labeled with patient identifiers (name, medical record number, and date of birth).     Filled out [facility] order form. Patient chose to have blood drawn in our office at time of visit. NIPS was drawn from right arm with a straight needle by Shawnee GREEN. .      If patient chose to have blood work drawn at a [facility] lab we requested patient notify MFM (via phone call or [email or web address] message) when blood collected so office can follow up on results.       Maternal Fetal Medicine will have results in approximately 5-7 business days and will call patient or notify via [email or web address].  Patient aware viewing lab result online will reveal fetal sex if ordered.    Patient verbalized understanding of all instructions and no questions at this time.

## 2024-11-15 LAB
HBV SURFACE AG SER QL: NORMAL
HCV AB SER QL: NORMAL
HIV 1+2 AB+HIV1 P24 AG SERPL QL IA: NORMAL
HIV 2 AB SERPL QL IA: NORMAL
HIV1 AB SERPL QL IA: NORMAL
HIV1 P24 AG SERPL QL IA: NORMAL
TREPONEMA PALLIDUM IGG+IGM AB [PRESENCE] IN SERUM OR PLASMA BY IMMUNOASSAY: NORMAL

## 2024-11-17 LAB
C TRACH RRNA SPEC QL NAA+PROBE: NEGATIVE
N GONORRHOEA RRNA SPEC QL NAA+PROBE: NEGATIVE

## 2024-11-18 ENCOUNTER — RESULTS FOLLOW-UP (OUTPATIENT)
Dept: OBGYN CLINIC | Facility: CLINIC | Age: 34
End: 2024-11-18

## 2024-11-18 ENCOUNTER — RESULTS FOLLOW-UP (OUTPATIENT)
Facility: HOSPITAL | Age: 34
End: 2024-11-18

## 2024-11-18 ENCOUNTER — TELEPHONE (OUTPATIENT)
Dept: OTHER | Facility: HOSPITAL | Age: 34
End: 2024-11-18

## 2024-11-18 LAB
CFDNA.FET/CFDNA.TOTAL SFR FETUS: NORMAL %
CITATION REF LAB TEST: NORMAL
FET 13+18+21+X+Y ANEUP PLAS.CFDNA: NEGATIVE
FET CHR 21 TS PLAS.CFDNA QL: NEGATIVE
FET CHR 21 TS PLAS.CFDNA QL: NEGATIVE
FET MS X RISK WBC.DNA+CFDNA QL: NOT DETECTED
FET SEX PLAS.CFDNA DOSAGE CFDNA: NORMAL
FET TS 13 RISK PLAS.CFDNA QL: NEGATIVE
FET X + Y ANEUP RISK PLAS.CFDNA SEQ-IMP: NOT DETECTED
GA EST FROM CONCEPTION DATE: NORMAL D
GESTATIONAL AGE > 9:: YES
HGB A MFR BLD: 2.8 % (ref 1.8–3.2)
HGB A MFR BLD: 97.2 % (ref 96.4–98.8)
HGB F MFR BLD: 0 % (ref 0–2)
HGB FRACT BLD-IMP: NORMAL
HGB S MFR BLD: 0 %
LAB DIRECTOR NAME PROVIDER: NORMAL
LAB DIRECTOR NAME PROVIDER: NORMAL
LABORATORY COMMENT REPORT: NORMAL
LIMITATIONS OF THE TEST: NORMAL
NEGATIVE PREDICTIVE VALUE: NORMAL
PERFORMANCE CHARACTERISTICS: NORMAL
POSITIVE PREDICTIVE VALUE: NORMAL
REF LAB TEST METHOD: NORMAL
SL AMB NOTE:: NORMAL
TEST PERFORMANCE INFO SPEC: NORMAL

## 2024-11-18 NOTE — TELEPHONE ENCOUNTER
Patient called the RX Refill Line. Message is being forwarded to the office.     Patient was calling regarding results but was nervous to check herself due to not wanting to know the gender yet. I did inform her of the doctors note provided and confirmed that the results do have the gender because she did not want to open it up on [employer] if it did. Patient would like to  envelope from the office to have a gender reveal.     Please contact patient at [phone]

## 2024-11-18 NOTE — TELEPHONE ENCOUNTER
Return TC to Isaura. Isaura would like to  a gender envelope at [facility] location today, 11/18/24. I notified the [facility] team and they will have that ready for patient at the . Patient is aware that she needs ID to pick it up and that office closes at 415 pm.   Isaura had no further questions or concerns at this time.

## 2024-11-19 LAB
CITATION REF LAB TEST: NORMAL
CLINICAL INFO: NORMAL
ETHNIC BACKGROUND STATED: NORMAL
GENE DIS ANL CARRIER INTERP-IMP: NORMAL
GENE MUT TESTED BLD/T: NORMAL
LAB DIRECTOR NAME PROVIDER: NORMAL
REASON FOR REFERRAL (NARRATIVE): NORMAL
RECOMMENDATION PATIENT DOC-IMP: NORMAL
REF LAB TEST METHOD: NORMAL
SERVICE CMNT-IMP: NORMAL
SMN1 GENE MUT ANL BLD/T: NORMAL
SPECIMEN SOURCE: NORMAL

## 2024-11-29 LAB
CFTR FULL MUT ANL BLD/T SEQ: NORMAL
CITATION REF LAB TEST: NORMAL
CLINICAL INFO: NORMAL
ETHNIC BACKGROUND STATED: NORMAL
GENE DIS ANL CARRIER INTERP-IMP: NORMAL
INDICATION: NORMAL
LAB DIRECTOR NAME PROVIDER: NORMAL
RECOMMENDATION PATIENT DOC-IMP: NORMAL
REF LAB TEST METHOD: NORMAL
SERVICE CMNT-IMP: NORMAL
SPECIMEN SOURCE: NORMAL

## 2024-12-09 ENCOUNTER — ROUTINE PRENATAL (OUTPATIENT)
Dept: OBGYN CLINIC | Facility: CLINIC | Age: 34
End: 2024-12-09

## 2024-12-09 VITALS
SYSTOLIC BLOOD PRESSURE: 118 MMHG | HEIGHT: 67 IN | WEIGHT: 154 LBS | DIASTOLIC BLOOD PRESSURE: 70 MMHG | BODY MASS INDEX: 24.17 KG/M2

## 2024-12-09 DIAGNOSIS — Z3A.17 17 WEEKS GESTATION OF PREGNANCY: Primary | ICD-10-CM

## 2024-12-09 PROCEDURE — PNV: Performed by: PHYSICIAN ASSISTANT

## 2024-12-09 NOTE — PROGRESS NOTES
OB/GYN  PN Visit  Isaura Ballesteros  [id number]  2024  9:07 AM  Oanh Angeles PA-C    S: 34 y.o.  17w0d here for PN visit. Pregnancy uncomplicated.     OB complaints:  Denies c/o n/v/ha, no edema, no smoking, no DV.   No vb/lof  No cramping/ctxns or signs of PTL.    She reports that overall she is feeling well.    O:    Pre-Devi Vitals      Flowsheet Row Most Recent Value   Prenatal Assessment    Fetal Heart Rate 154   Fundal Height (cm) 18 cm   Movement Present   Prenatal Vitals    Blood Pressure 118/70   Weight - Scale 69.9 kg (154 lb)   Urine Albumin/Glucose    Dilation/Effacement/Station    Vaginal Drainage    Draining Fluid No   Edema    LLE Edema None   RLE Edema None   Facial Edema None              Gen: no acute distress, nonlabored breathing.  OB exam completed: fundal height, +FHT.  Urine: -/-    A/P:    1. 17 weeks gestation of pregnancy  Assessment & Plan:  Overview:     Labs normal   Pap smear: 24 neg HPV +      GC/CT: 24 negative   Prenatal Panel: normal   Blood Type: O+ RhoGam not  indicated   GBS: will plan at 36 weeks    Genetic Testin24 NIPS negative, AFP ordered, carrier screening negative    Vaccines:  Tdap: will review at 27+ weeks  Flu: 24    Birth Plan:   Yellow packet given and consents to be signed at 30 wks.   Feeding Plan: breast  Breast pump: will plan to order at end of second trimester.   Pediatrician: will give info in 30 wks packet and place referral  Contraception: likely plans POP, used OCPs previously. Desires another pregnancy after 2-3 years.   Ultrasounds: 24 early anatomy WNL    RTC in 4 weeks  Orders:  -     Alpha fetoprotein, maternal; Future          Oanh Angeles PA-C  2024  9:07 AM

## 2024-12-09 NOTE — ASSESSMENT & PLAN NOTE
Overview:     Labs normal   Pap smear: 24 neg HPV +      GC/CT: 24 negative   Prenatal Panel: normal   Blood Type: O+ RhoGam not  indicated   GBS: will plan at 36 weeks    Genetic Testin24 NIPS negative, AFP ordered, carrier screening negative    Vaccines:  Tdap: will review at 27+ weeks  Flu: 24    Birth Plan:   Yellow packet given and consents to be signed at 30 wks.   Feeding Plan: breast  Breast pump: will plan to order at end of second trimester.   Pediatrician: will give info in 30 wks packet and place referral  Contraception: likely plans POP, used OCPs previously. Desires another pregnancy after 2-3 years.   Ultrasounds: 24 early anatomy WNL    RTC in 4 weeks

## 2024-12-20 ENCOUNTER — TELEPHONE (OUTPATIENT)
Age: 34
End: 2024-12-20

## 2024-12-20 NOTE — TELEPHONE ENCOUNTER
Patient who is 18w4d is calling to find out time frame to do MSAFP testing.  She was advised it's preferred between 16-18w but she can still do it until 21w6d.  Pt verbalized understanding, no further questions or concerns at this time.

## 2024-12-30 NOTE — PATIENT INSTRUCTIONS

## 2025-01-03 ENCOUNTER — APPOINTMENT (OUTPATIENT)
Dept: LAB | Facility: CLINIC | Age: 35
End: 2025-01-03
Payer: COMMERCIAL

## 2025-01-03 ENCOUNTER — ROUTINE PRENATAL (OUTPATIENT)
Facility: HOSPITAL | Age: 35
End: 2025-01-03
Payer: COMMERCIAL

## 2025-01-03 VITALS
WEIGHT: 153.66 LBS | BODY MASS INDEX: 24.12 KG/M2 | HEART RATE: 75 BPM | HEIGHT: 67 IN | DIASTOLIC BLOOD PRESSURE: 66 MMHG | OXYGEN SATURATION: 99 % | SYSTOLIC BLOOD PRESSURE: 114 MMHG

## 2025-01-03 DIAGNOSIS — Z3A.20 20 WEEKS GESTATION OF PREGNANCY: Primary | ICD-10-CM

## 2025-01-03 DIAGNOSIS — Z3A.17 17 WEEKS GESTATION OF PREGNANCY: ICD-10-CM

## 2025-01-03 PROCEDURE — 76817 TRANSVAGINAL US OBSTETRIC: CPT | Performed by: OBSTETRICS & GYNECOLOGY

## 2025-01-03 PROCEDURE — 76805 OB US >/= 14 WKS SNGL FETUS: CPT | Performed by: OBSTETRICS & GYNECOLOGY

## 2025-01-03 PROCEDURE — 99213 OFFICE O/P EST LOW 20 MIN: CPT | Performed by: OBSTETRICS & GYNECOLOGY

## 2025-01-03 PROCEDURE — 82105 ALPHA-FETOPROTEIN SERUM: CPT

## 2025-01-03 PROCEDURE — 36415 COLL VENOUS BLD VENIPUNCTURE: CPT

## 2025-01-03 NOTE — PROGRESS NOTES
Ultrasound Probe Disinfection    A transvaginal ultrasound was performed.   Prior to use, disinfection was performed with High Level Disinfection Process ([employer]).  Probe serial number [id number]: [id number] was used.    Katie DEAN Radice  01/03/25  2:26 PM

## 2025-01-03 NOTE — LETTER
January 3, 2025     Giovanny Ennis PA-C  [address]  [place] [address]    Patient: Isaura Ballesteros   YOB: 1990   Date of Visit: 1/3/2025       Dear Ms. Ennis:    Thank you for referring Isaura Ballesteros to me for evaluation. Below are my notes for this consultation.    If you have questions, please do not hesitate to call me. I look forward to following your patient along with you.         Sincerely,        Isidro Sumner MD        CC: No Recipients    Isidro Sumner MD  1/3/2025  4:00 PM  Sign when Signing Visit  A fetal ultrasound was completed. See Ob procedures in Epic for an interpretation and recommendations. Do not hesitate to contact us in [facility] if you have questions.    Isidro Sumner MD, MSCE  Maternal Fetal Medicine

## 2025-01-03 NOTE — PROGRESS NOTES
A fetal ultrasound was completed. See Ob procedures in Epic for an interpretation and recommendations. Do not hesitate to contact us in [facility] if you have questions.    Isidro Sumner MD, MSCE  Maternal Fetal Medicine

## 2025-01-04 PROBLEM — N92.6 IRREGULAR MENSES: Status: RESOLVED | Noted: 2021-04-05 | Resolved: 2025-01-04

## 2025-01-04 PROBLEM — Z11.9 ENCOUNTER FOR SCREENING FOR INFECTIOUS AND PARASITIC DISEASES, UNSPECIFIED: Status: RESOLVED | Noted: 2021-04-05 | Resolved: 2025-01-04

## 2025-01-04 PROBLEM — B34.9 VIRAL INFECTION, UNSPECIFIED: Status: RESOLVED | Noted: 2021-12-23 | Resolved: 2025-01-04

## 2025-01-04 PROBLEM — Z03.818 ENCOUNTER FOR OBSERVATION FOR SUSPECTED EXPOSURE TO OTHER BIOLOGICAL AGENTS RULED OUT: Status: RESOLVED | Noted: 2021-12-23 | Resolved: 2025-01-04

## 2025-01-04 PROBLEM — Z3A.20 20 WEEKS GESTATION OF PREGNANCY: Status: ACTIVE | Noted: 2024-11-11

## 2025-01-04 PROBLEM — R19.7 DIARRHEA: Status: RESOLVED | Noted: 2021-04-05 | Resolved: 2025-01-04

## 2025-01-04 PROBLEM — Z30.41 ENCOUNTER FOR SURVEILLANCE OF CONTRACEPTIVE PILLS: Status: RESOLVED | Noted: 2019-07-25 | Resolved: 2025-01-04

## 2025-01-04 NOTE — ASSESSMENT & PLAN NOTE
- Continue PNV  - Labs: UTD  - Genetics: NIPT neg; MSAFP still in process  - Ultrasounds: Level II US wnl on 1/3/25; Next US scheduled for 3/3/25  - Tdap: Will offer after 27 weeks gestation  - Flu Shot: Done 24   - RSV:  Will offer during season (-) @ 28h1g-87p2p   - COVID: Vaccinated; booster encouraged     - Rhogam: N/A  - Delivery:  with epidural  - Contraception: Likely POPs (hx OCP use)  - Breastfeeding: Yes  - Pediatrician: TBD  - RTO in 4 weeks

## 2025-01-04 NOTE — PROGRESS NOTES
OB/GYN  PN Visit  Isaura Ballesteros  [id number]  2025  4:55 PM  Dr. Ariana Harrell MD    S: 34 y.o.  21w0d here for PN visit. She denies contractions. She denies leakage of fluid and vaginal bleeding. She reports good fetal movement. She denies nausea, vomiting, headache, cramping, edema, domestic violence, and smoking. Her pregnancy is uncomplicated.     O:  Pre- Vitals      Flowsheet Row Most Recent Value   Prenatal Assessment    Fetal Heart Rate 160   Fundal Height (cm) 20 cm   Movement Present   Prenatal Vitals    Blood Pressure 120/70   Weight - Scale 70.3 kg (155 lb)   Urine Albumin/Glucose    Dilation/Effacement/Station    Vaginal Drainage    Draining Fluid No   Edema    LLE Edema None   RLE Edema None          Physical Exam  Vitals reviewed.   Constitutional:       General: She is not in acute distress.     Appearance: Normal appearance. She is well-developed. She is not ill-appearing, toxic-appearing or diaphoretic.   Cardiovascular:      Rate and Rhythm: Normal rate.   Pulmonary:      Effort: Pulmonary effort is normal. No respiratory distress.   Abdominal:      General: There is no distension.      Palpations: Abdomen is soft. There is no mass.      Tenderness: There is no abdominal tenderness. There is no guarding or rebound.   Genitourinary:     Comments: Gravid, nontender  Skin:     General: Skin is warm and dry.   Neurological:      Mental Status: She is alert and oriented to person, place, and time.   Psychiatric:         Mood and Affect: Mood normal.         Behavior: Behavior normal.         A/P:  Assessment & Plan  Prenatal care, second trimester  - Continue PNV  - Labs: UTD  - Genetics: NIPT neg; MSAFP still in process  - Ultrasounds: Level II US wnl on 1/3/25; Next US scheduled for 3/3/25  - Tdap: Will offer after 27 weeks gestation  - Flu Shot: Done 24   - RSV:  Will offer during season (-) @ 25y1m-85n6s   - COVID: Vaccinated; booster encouraged     - Rhogam:  N/A  - Delivery:  with epidural  - Contraception: Likely POPs (hx OCP use)  - Breastfeeding: Yes  - Pediatrician: TBD  - RTO in 4 weeks         Future Appointments   Date Time Provider Department [place]   2025  1:15 PM Jaky Ellington MD [facility]   2025  9:00 AM Giovanny Ennis PA-C [facility]   3/3/2025 10:00 AM  US 2 [facility]   3/6/2025  2:15 PM Sarika Bruce MD [facility]   3/18/2025  8:15 AM [facility]   2025 10:30 AM Giovanny Ennis PA-C [facility]         Ariana Harrell MD  2025  4:55 PM

## 2025-01-06 ENCOUNTER — ROUTINE PRENATAL (OUTPATIENT)
Dept: OBGYN CLINIC | Facility: CLINIC | Age: 35
End: 2025-01-06

## 2025-01-06 VITALS — SYSTOLIC BLOOD PRESSURE: 120 MMHG | WEIGHT: 155 LBS | DIASTOLIC BLOOD PRESSURE: 70 MMHG | BODY MASS INDEX: 24.28 KG/M2

## 2025-01-06 DIAGNOSIS — Z34.92 PRENATAL CARE, SECOND TRIMESTER: Primary | ICD-10-CM

## 2025-01-06 PROCEDURE — PNV: Performed by: OBSTETRICS & GYNECOLOGY

## 2025-01-06 RX ORDER — CLINDAMYCIN PHOSPHATE 10 UG/ML
LOTION TOPICAL
COMMUNITY
Start: 2024-11-29

## 2025-01-07 ENCOUNTER — TELEPHONE (OUTPATIENT)
Age: 35
End: 2025-01-07

## 2025-01-07 ENCOUNTER — TELEPHONE (OUTPATIENT)
Dept: OBGYN CLINIC | Facility: CLINIC | Age: 35
End: 2025-01-07

## 2025-01-07 ENCOUNTER — RESULTS FOLLOW-UP (OUTPATIENT)
Dept: OBGYN CLINIC | Facility: CLINIC | Age: 35
End: 2025-01-07

## 2025-01-07 LAB
2ND TRIMESTER 4 SCREEN SERPL-IMP: NORMAL
AFP ADJ MOM SERPL: 1.82
AFP INTERP AMN-IMP: NORMAL
AFP INTERP SERPL-IMP: NORMAL
AFP INTERP SERPL-IMP: NORMAL
AFP SERPL-MCNC: 107.5 NG/ML
AGE AT DELIVERY: 33.4 YR
GA METHOD: NORMAL
GA: 20.6 WEEKS
IDDM PATIENT QL: NO
MULTIPLE PREGNANCY: NO
NEURAL TUBE DEFECT RISK FETUS: 1218 %

## 2025-01-07 NOTE — TELEPHONE ENCOUNTER
"Rcvd ESC from Dr. Bruce - \"...I am going to have the office call and see if lab can be recalculated by her gestational weeks because it looks like they did this calculating she was 17 weeks gestation. Furthermore her recent ultarsound noted no concern for anomalies so please reassure her at this time while we are reaching out to lab to recalculate.\"     Called patient and reviewed on-call provider recommendations. Patient verbalized understanding and expressed appreciation for information. Advised patient per Dr. Bruce, it may take 1-2 days to hear back from lab. Patient verbalized understanding.    "

## 2025-01-07 NOTE — TELEPHONE ENCOUNTER
Patient called in today looking for recommendations on abnormal test result. States she is feeling very concerned and hoping it be reviewed ASAP for peace of mind. Routing to on-call provider to please advise. ESC also sent due to time sensitive patient concern.

## 2025-01-07 NOTE — TELEPHONE ENCOUNTER
----- Message from Sarika Bruce MD sent at 1/7/2025  1:00 PM EST -----  Regarding: MSAFP screen recalculation  Can we please reach out to lab for MSAFP result- it appears it was calculated at 17 weeks with positive screen when patient was 20 weeks and 4 days at the time when her test was completed.    Thank you-  Sarika Bruce MD  [facility]

## 2025-01-07 NOTE — TELEPHONE ENCOUNTER
Called [facility] & requested recalculation MSAFP for 20 wk 4 days on 1/3/2025.  Will send ammended report.

## 2025-01-07 NOTE — TELEPHONE ENCOUNTER
Pt calling regarding abnormal Maternal Alpha fetoprotein lab result. RN advised provider has not reviewed yet, however, will route to provider for review and staff will return a call right away. Pt agreeable to plan, no further questions.

## 2025-01-15 NOTE — TELEPHONE ENCOUNTER
Left message patient's VM re: ammended MSAFP results - wnl per Dr Bruce.  [employer] message sent to patient.

## 2025-01-31 NOTE — PROGRESS NOTES
OB/GYN  PN Visit  Isaura Ballesteros  [id number]  2/3/2025  8:43 AM  PATRICIA Aponte    S: 34 y.o.  25w0d here for PN visit.   She denies contractions. She denies leakage of fluid and vaginal bleeding.   She endorses good fetal movement.   She denies nausea, vomiting, headache, cramping, edema, and smoking.   Her pregnancy is uncomplicated.   She reports feeling well.     O:  Pre-Devi Vitals      Flowsheet Row Most Recent Value   Prenatal Assessment    Fetal Heart Rate 150   Fundal Height (cm) 25 cm   Movement Present   Prenatal Vitals    Blood Pressure 112/68   Weight - Scale 73 kg (161 lb)   Urine Albumin/Glucose    Dilation/Effacement/Station    Vaginal Drainage    Edema           Wt=73 kg (161 lb); Body mass index is 25.22 kg/m².; TWG=5.897 kg (13 lb)  Physical Exam    General: Well appearing, no distress  Respiratory: Unlabored breathing  Abdomen: Soft, gravid, nontender  Fundal Height: Appropriate for gestational age.   Extremities: Warm and well perfused.  Non tender.  OB exam completed: fundal height, +FHT.  Urine: -/-     A/P:    Problem List Items Addressed This Visit       25 weeks gestation of pregnancy - Primary    - Continue PNV  - Labs: UTD. Third tri labs ordered today   - Genetics: NIPT neg; MSAFP negative  - Ultrasounds: Level II US wnl on 1/3/25; appt today for follow up missed anatomy. Next US scheduled for 3/3/25  - Tdap: Will offer after 27 weeks gestation  - Flu Shot: Done 24   - COVID: Vaccinated; booster encouraged     - Rhogam: N/A  - Delivery:  with epidural  - Contraception: Likely POPs (hx OCP use)  - Breastfeeding: Yes  - Pediatrician: TBD  - RTO in 4 weeks             Relevant Orders    CBC and differential    Glucose, 1H PG    RPR-Syphilis Screening (Total Syphilis IGG/IGM)    Type and screen    Anemia Panel w/Reflex, OB       PATRICIA Aponte  2/3/2025  8:43 AM

## 2025-02-03 ENCOUNTER — ROUTINE PRENATAL (OUTPATIENT)
Dept: OBGYN CLINIC | Facility: CLINIC | Age: 35
End: 2025-02-03

## 2025-02-03 VITALS
BODY MASS INDEX: 25.27 KG/M2 | WEIGHT: 161 LBS | DIASTOLIC BLOOD PRESSURE: 68 MMHG | SYSTOLIC BLOOD PRESSURE: 112 MMHG | HEIGHT: 67 IN

## 2025-02-03 DIAGNOSIS — Z34.92 PRENATAL CARE IN SECOND TRIMESTER: ICD-10-CM

## 2025-02-03 DIAGNOSIS — Z3A.25 25 WEEKS GESTATION OF PREGNANCY: Primary | ICD-10-CM

## 2025-02-03 PROCEDURE — PNV

## 2025-02-03 NOTE — ASSESSMENT & PLAN NOTE
- Continue PNV  - Labs: UTD. Third tri labs ordered today   - Genetics: NIPT neg; MSAFP negative  - Ultrasounds: Level II US wnl on 1/3/25; appt today for follow up missed anatomy. Next US scheduled for 3/3/25  - Tdap: Will offer after 27 weeks gestation  - Flu Shot: Done 24   - COVID: Vaccinated; booster encouraged     - Rhogam: N/A  - Delivery:  with epidural  - Contraception: Likely POPs (hx OCP use)  - Breastfeeding: Yes  - Pediatrician: TBD  - RTO in 4 weeks

## 2025-02-07 ENCOUNTER — ULTRASOUND (OUTPATIENT)
Facility: HOSPITAL | Age: 35
End: 2025-02-07
Payer: COMMERCIAL

## 2025-02-07 VITALS
WEIGHT: 163 LBS | SYSTOLIC BLOOD PRESSURE: 110 MMHG | BODY MASS INDEX: 25.58 KG/M2 | HEIGHT: 67 IN | HEART RATE: 73 BPM | DIASTOLIC BLOOD PRESSURE: 78 MMHG

## 2025-02-07 DIAGNOSIS — Z3A.25 25 WEEKS GESTATION OF PREGNANCY: ICD-10-CM

## 2025-02-07 DIAGNOSIS — Z36.2 ENCOUNTER FOR OTHER ANTENATAL SCREENING FOLLOW-UP: Primary | ICD-10-CM

## 2025-02-07 PROCEDURE — 76816 OB US FOLLOW-UP PER FETUS: CPT | Performed by: OBSTETRICS & GYNECOLOGY

## 2025-02-07 PROCEDURE — 99213 OFFICE O/P EST LOW 20 MIN: CPT | Performed by: OBSTETRICS & GYNECOLOGY

## 2025-02-07 NOTE — PROGRESS NOTES
The patient was seen today for an ultrasound.  Please see ultrasound report (located under Ob Procedures) for additional details.   Thank you very much for allowing us to participate in the care of this very nice patient.  Should you have any questions, please do not hesitate to contact me.     Lonny Haas MD FACOG  Attending Physician, Maternal-Fetal Medicine  [facility]

## 2025-02-12 NOTE — PROGRESS NOTES
OB/GYN  PN Visit  Isaura Ballesteros  [id number]  2025  9:25 AM  Giovanny Ennis PA-C    S: 34 y.o.  26w2d here for PN visit. Pregnancy complicated by primigravida.    OB complaints:  Denies c/o n/v/ha, no edema, no smoking, no DV.   No vb/lof  No cramping/ctxns or signs of PTL.  Reviewed normal third tri labs.      O:    Pre-Devi Vitals      Flowsheet Row Most Recent Value   Prenatal Assessment    Fetal Heart Rate 150   Fundal Height (cm) 27 cm   Movement Present   Prenatal Vitals    Blood Pressure 116/72   Weight - Scale 73.9 kg (163 lb)   Urine Albumin/Glucose    Dilation/Effacement/Station    Vaginal Drainage    Draining Fluid No   Edema    LLE Edema None   RLE Edema None              Gen: no acute distress, nonlabored breathing.  OB exam completed: fundal height, +FHT.  Urine: -/-    A/P:    1. Prenatal care in second trimester      - Continue PNV  - Labor precautions reviewed; signs and symptoms of preE reviewed  - Fetal kick counts reviewed  - Labs: UTD  -Pap: 24 - WNL (+) HRHPV type  neg; 24 - C/G neg  -Rh: pos  - Genetics: NIPS and msAFP neg  - Ultrasounds: f/u growth 32-34w - scheduled 25  - Tdap: Will offer after 27w - defers today but will plan on  - Flu Shot: 24  - RSV: Will offer during season (-) @ [id number]   - Rhogam: NA  - Delivery:  with epidural   - Contraception: likely POPs  - Breastfeeding: order for pump placed today  - Pediatrician: undecided - referral placed today  -Delivery Consent & Packet: at 30w  -GBS: at 36 weeks  -RTO 4 weeks    Giovanny Ennis PA-C  2025  9:25 AM

## 2025-02-13 ENCOUNTER — APPOINTMENT (OUTPATIENT)
Dept: LAB | Facility: CLINIC | Age: 35
End: 2025-02-13
Payer: COMMERCIAL

## 2025-02-13 DIAGNOSIS — Z3A.25 25 WEEKS GESTATION OF PREGNANCY: ICD-10-CM

## 2025-02-13 LAB
ABO GROUP BLD: NORMAL
BASOPHILS # BLD AUTO: 0.02 THOUSANDS/ÂΜL (ref 0–0.1)
BASOPHILS NFR BLD AUTO: 0 % (ref 0–1)
BLD GP AB SCN SERPL QL: NEGATIVE
EOSINOPHIL # BLD AUTO: 0.04 THOUSAND/ÂΜL (ref 0–0.61)
EOSINOPHIL NFR BLD AUTO: 0 % (ref 0–6)
ERYTHROCYTE [DISTWIDTH] IN BLOOD BY AUTOMATED COUNT: 12.6 % (ref 11.6–15.1)
GLUCOSE 1H P 50 G GLC PO SERPL-MCNC: 112 MG/DL (ref 70–134)
HCT VFR BLD AUTO: 34.4 % (ref 34.8–46.1)
HGB BLD-MCNC: 11.9 G/DL (ref 11.5–15.4)
IMM GRANULOCYTES # BLD AUTO: 0.05 THOUSAND/UL (ref 0–0.2)
IMM GRANULOCYTES NFR BLD AUTO: 1 % (ref 0–2)
LYMPHOCYTES # BLD AUTO: 1.27 THOUSANDS/ÂΜL (ref 0.6–4.47)
LYMPHOCYTES NFR BLD AUTO: 13 % (ref 14–44)
MCH RBC QN AUTO: 32.2 PG (ref 26.8–34.3)
MCHC RBC AUTO-ENTMCNC: 34.6 G/DL (ref 31.4–37.4)
MCV RBC AUTO: 93 FL (ref 82–98)
MONOCYTES # BLD AUTO: 0.47 THOUSAND/ÂΜL (ref 0.17–1.22)
MONOCYTES NFR BLD AUTO: 5 % (ref 4–12)
NEUTROPHILS # BLD AUTO: 8.35 THOUSANDS/ÂΜL (ref 1.85–7.62)
NEUTS SEG NFR BLD AUTO: 81 % (ref 43–75)
NRBC BLD AUTO-RTO: 0 /100 WBCS
PLATELET # BLD AUTO: 220 THOUSANDS/UL (ref 149–390)
PMV BLD AUTO: 9.7 FL (ref 8.9–12.7)
RBC # BLD AUTO: 3.7 MILLION/UL (ref 3.81–5.12)
RH BLD: POSITIVE
SPECIMEN EXPIRATION DATE: NORMAL
WBC # BLD AUTO: 10.2 THOUSAND/UL (ref 4.31–10.16)

## 2025-02-13 PROCEDURE — 86850 RBC ANTIBODY SCREEN: CPT

## 2025-02-13 PROCEDURE — 85025 COMPLETE CBC W/AUTO DIFF WBC: CPT

## 2025-02-13 PROCEDURE — 86901 BLOOD TYPING SEROLOGIC RH(D): CPT

## 2025-02-13 PROCEDURE — 36415 COLL VENOUS BLD VENIPUNCTURE: CPT

## 2025-02-13 PROCEDURE — 82950 GLUCOSE TEST: CPT

## 2025-02-13 PROCEDURE — 86780 TREPONEMA PALLIDUM: CPT

## 2025-02-13 PROCEDURE — 86900 BLOOD TYPING SEROLOGIC ABO: CPT

## 2025-02-14 ENCOUNTER — RESULTS FOLLOW-UP (OUTPATIENT)
Dept: OBGYN CLINIC | Facility: CLINIC | Age: 35
End: 2025-02-14

## 2025-02-14 LAB — TREPONEMA PALLIDUM IGG+IGM AB [PRESENCE] IN SERUM OR PLASMA BY IMMUNOASSAY: NORMAL

## 2025-02-18 ENCOUNTER — ROUTINE PRENATAL (OUTPATIENT)
Dept: OBGYN CLINIC | Facility: CLINIC | Age: 35
End: 2025-02-18

## 2025-02-18 VITALS — DIASTOLIC BLOOD PRESSURE: 72 MMHG | BODY MASS INDEX: 25.53 KG/M2 | WEIGHT: 163 LBS | SYSTOLIC BLOOD PRESSURE: 116 MMHG

## 2025-02-18 DIAGNOSIS — Z34.92 PRENATAL CARE IN SECOND TRIMESTER: Primary | ICD-10-CM

## 2025-02-18 PROCEDURE — PNV: Performed by: PHYSICIAN ASSISTANT

## 2025-02-19 LAB
DME PARACHUTE DELIVERY DATE REQUESTED: NORMAL
DME PARACHUTE ITEM DESCRIPTION: NORMAL
DME PARACHUTE ORDER STATUS: NORMAL
DME PARACHUTE SUPPLIER NAME: NORMAL
DME PARACHUTE SUPPLIER PHONE: NORMAL

## 2025-02-20 ENCOUNTER — TELEPHONE (OUTPATIENT)
Dept: OBGYN CLINIC | Facility: CLINIC | Age: 35
End: 2025-02-20

## 2025-02-26 ENCOUNTER — TELEPHONE (OUTPATIENT)
Dept: OBGYN CLINIC | Facility: CLINIC | Age: 35
End: 2025-02-26

## 2025-02-26 NOTE — TELEPHONE ENCOUNTER
POPEYE for patient making her aware that [facility] paperwork for her  was completed  and faxedand we sent the forms to her via [employer].

## 2025-03-01 LAB
DME PARACHUTE DELIVERY DATE ACTUAL: NORMAL
DME PARACHUTE DELIVERY DATE REQUESTED: NORMAL
DME PARACHUTE ITEM DESCRIPTION: NORMAL
DME PARACHUTE ORDER STATUS: NORMAL
DME PARACHUTE SUPPLIER NAME: NORMAL
DME PARACHUTE SUPPLIER PHONE: NORMAL

## 2025-03-06 ENCOUNTER — ROUTINE PRENATAL (OUTPATIENT)
Dept: OBGYN CLINIC | Facility: CLINIC | Age: 35
End: 2025-03-06

## 2025-03-06 VITALS — BODY MASS INDEX: 26.44 KG/M2 | WEIGHT: 168.8 LBS | SYSTOLIC BLOOD PRESSURE: 114 MMHG | DIASTOLIC BLOOD PRESSURE: 70 MMHG

## 2025-03-06 DIAGNOSIS — Z34.93 PRENATAL CARE IN THIRD TRIMESTER: Primary | ICD-10-CM

## 2025-03-06 PROCEDURE — PNV: Performed by: STUDENT IN AN ORGANIZED HEALTH CARE EDUCATION/TRAINING PROGRAM

## 2025-03-06 NOTE — PROGRESS NOTES
OB/GYN  PN Visit  Isaura Ballesteros  [id number]  3/6/2025  2:32 PM  Sarika Bruce MD    S: 34 y.o.  29w3d here for PN visit-she denies any contractions, cramping, loss of fluid or vaginal bleeding.  She endorses good fetal movement.    Urine negative/negative.      O:  Vitals:    25 1400   BP: 114/70       A/P:  #1. 29w3d GESTATION  - Continue PNV  - Labor precautions reviewed  - Fetal kick counts reviewed  - Labs: UTD  -Pap: 24 - WNL (+) HRHPV type  neg; 24 - C/G neg  -Rh: pos  - Genetics: NIPS and msAFP neg  - Ultrasounds: f/u growth 32-34w - scheduled 25  - Tdap: Reviewed today-amendable to receiving next visit.  - Flu Shot: 24  - RSV: Will offer during season (-) @ [id number]   - Rhogam: NA  - Delivery:  with epidural   - Contraception: likely POPs  - Breastfeeding: ordered previously   - Pediatrician: undecided - referral placed today  -Delivery Consent & Packet: Given today and reviewed, delivery consent reviewed and signed today.  -GBS: at 36 weeks  -RTO 2 weeks        Sarika Bruce MD  3/6/2025  2:32 PM

## 2025-03-16 PROBLEM — Z3A.31 31 WEEKS GESTATION OF PREGNANCY: Status: ACTIVE | Noted: 2024-11-11

## 2025-03-16 NOTE — PROGRESS NOTES
OB/GYN  PN Visit  Isaura Ballesteros  [id number]  3/18/2025  8:37 AM  Oanh Angeles PA-C    S: 34 y.o.  30w6d here for PN visit. Pregnancy uncomplicated.     OB complaints:  Denies c/o n/v/ha, no edema, no smoking, no DV.   No vb/lof  No cramping/ctxns or signs of PTL.    She reports that overall she is feeling well.  She just moved her home last week and did feel more fatigued after that. Denies any cramping or VB.     O:    Pre-Devi Vitals    Flowsheet Row Most Recent Value   Prenatal Assessment    Fetal Heart Rate 140   Fundal Height (cm) 30 cm   Movement Present   Prenatal Vitals    Blood Pressure 114/74   Weight - Scale 76.1 kg (167 lb 12.8 oz)   Urine Albumin/Glucose    Dilation/Effacement/Station    Vaginal Drainage    Draining Fluid No   Edema    LLE Edema None   RLE Edema None   Facial Edema None            Gen: no acute distress, nonlabored breathing.  OB exam completed: fundal height, +FHT.  Urine: -/-    A/P:    1. 31 weeks gestation of pregnancy  2. Prenatal care in third trimester  Overview:  - Continue PNV  - Fetal kick counts reviewed  - Labs: UTD  -Pap: 24 - WNL (+) HRHPV type  neg; 24 - C/G neg  -Rh: pos  - Genetics: NIPS and msAFP neg  - Ultrasounds: f/u growth 32-34w - scheduled 25  - Tdap: will plan at 33 week appt  - Flu Shot: 24  - Rhogam: NA  - Delivery:  with epidural   - Contraception: likely POPs  - Breastfeeding: order for pump placed today  - Pediatrician: undecided - referral placed today  -Delivery Consent & Packet:given and consents signed previously  -GBS: at 36 weeks  -RTO 2 weeks        RTC in 2 weeks    Oanh Angeles PA-C  3/18/2025  8:37 AM

## 2025-03-18 ENCOUNTER — ROUTINE PRENATAL (OUTPATIENT)
Dept: OBGYN CLINIC | Facility: CLINIC | Age: 35
End: 2025-03-18

## 2025-03-18 VITALS
BODY MASS INDEX: 26.34 KG/M2 | WEIGHT: 167.8 LBS | DIASTOLIC BLOOD PRESSURE: 74 MMHG | SYSTOLIC BLOOD PRESSURE: 114 MMHG | HEIGHT: 67 IN | RESPIRATION RATE: 2 BRPM

## 2025-03-18 DIAGNOSIS — Z3A.31 31 WEEKS GESTATION OF PREGNANCY: Primary | ICD-10-CM

## 2025-03-18 DIAGNOSIS — Z34.93 PRENATAL CARE IN THIRD TRIMESTER: ICD-10-CM

## 2025-03-18 PROCEDURE — PNV: Performed by: PHYSICIAN ASSISTANT

## 2025-04-03 NOTE — PATIENT INSTRUCTIONS
"Patient Education     Your baby's movement before birth   The Basics   Written by the doctors and editors at [facility]   When should I start feeling my baby move? -- It depends. Most people first feel their baby moving in the uterus between about 16 and 20 weeks of pregnancy. It might take longer to feel movement if this is your first pregnancy or if the placenta is in the front of your uterus.  What kinds of movements should I feel? -- When you first feel your baby move, it might feel like a gentle flutter in your belly. This is sometimes called \"quickening.\" As the baby grows, their movements will get stronger. You will probably feel them kicking, rolling, and stretching. Later in pregnancy, you might be able to see and feel the baby moving from the outside.  You might notice that your baby is more active at certain times of the day or night. Even before birth, babies have periods of being asleep and awake. When your baby is sleeping, you might notice that they do not move as much.  Should I keep track of my baby's movements? -- If your pregnancy is healthy, you probably do not need to count or record your baby's movements. Feeling regular movement is a good sign that the baby is doing well.  In some cases, your doctor or midwife might ask you to keep track of your baby's movements. If so, they will tell you how to do this and when to call them.  A change in your baby's movements does not always mean that there is a problem. But in some cases, it can be a sign that the baby is having trouble. If your doctor or midwife is concerned, they can do tests to check on the baby.  If I am asked to track movement, how should I do it? -- There are different ways of tracking your baby's movement. This is sometimes called \"kick counting.\"  Your doctor or midwife will tell you exactly what to track. For example, they might ask you to write down:   How long it takes to feel 10 kicks or movements   How many times your baby moves " in 1 hour  Many experts consider at least 10 movements in 2 hours to be a sign that the baby is doing well. But there is no specific cutoff for exactly how much movement is healthy or unhealthy. Some babies are more active than others, and some pregnant people feel movement more easily than others. The main goal of kick counting is to get to know your baby's normal patterns so you can tell if anything changes.  If you are doing kick counting:   Choose a time of day when your baby is usually active.   Find a quiet place where you will not be distracted.   Lie down on your side in a comfortable position.   Check the clock, or set a timer.   Each time you feel your baby move or kick, write down the time. Some people use a smartphone mildred to keep track.   If your baby seems less active than usual, try moving around, eating a snack, and emptying your bladder. This can help wake the baby up if they are asleep.   Stop counting after you have felt 10 kicks, or after the length of time your doctor or midwife told you.  When should I call the doctor? -- Call your doctor or midwife for advice if:   You have concerns about your baby's movement.   Your baby is moving less than they normally do.   You notice a sudden change in the pattern of your baby's movements.   You have any other symptoms that worry you.  All topics are updated as new evidence becomes available and our peer review process is complete.  This topic retrieved from [employer] on: Feb 26, 2024.  Topic 568657 Version 1.0  Release: 32.2.4 - C32.56  © 2024 UpToDate, Inc. and/or its affiliates. All rights reserved.  Consumer Information Use and Disclaimer   Disclaimer: This generalized information is a limited summary of diagnosis, treatment, and/or medication information. It is not meant to be comprehensive and should be used as a tool to help the user understand and/or assess potential diagnostic and treatment options. It does NOT include all information about  conditions, treatments, medications, side effects, or risks that may apply to a specific patient. It is not intended to be medical advice or a substitute for the medical advice, diagnosis, or treatment of a health care provider based on the health care provider's examination and assessment of a patient's specific and unique circumstances. Patients must speak with a health care provider for complete information about their health, medical questions, and treatment options, including any risks or benefits regarding use of medications. This information does not endorse any treatments or medications as safe, effective, or approved for treating a specific patient. UpToDate, Inc. and its affiliates disclaim any warranty or liability relating to this information or the use thereof.The use of this information is governed by the Terms of Use, available at https://www.[place] [employer].com/en/know/clinical-effectiveness-terms. © UpToDate, Inc. and its affiliates and/or licensors. All rights reserved.  Copyright   ©  UpToDate, Inc. and/or its affiliates. All rights reserved.  Thank you for choosing us for your  care today.  If you have any questions about your ultrasound or care, please do not hesitate to contact us or your primary obstetrician.        Some general instructions for your pregnancy are:    Exercise: Aim for 150 minutes per week of regular exercise.  Walking is great!  Nutrition: Choose healthy sources of calcium, iron, and protein.  Avoid ultraprocessed foods and added sugar.  Learn about Preeclampsia: preeclampsia is a common, potentially serious high blood pressure complication in pregnancy.  A blood pressure of 140mmHg (systolic or top number) or 90mmHg (diastolic or bottom number) should be evaluated by your doctor.  Aspirin is sometimes prescribed in early pregnancy to prevent preeclampsia in women with risk factors - ask your obstetrician if you should be on this medication.  For more  resources, visit:  https://www.highriskpregnancyinfo.org/preeclampsia  If you smoke, please try to quit completely but also try to reduce your smoking by as much as possible (as soon as possible).  Do not vape.  Please also avoid cannabis products.  Other warning signs to watch out for in pregnancy or postpartum: chest pain, obstructed breathing or shortness of breath, seizures, thoughts of hurting yourself or your baby, bleeding, a painful or swollen leg, fever, or headache (see [employer] POST-BIRTH Warning Signs campaign).  If these happen call 911.  Itching is also not normal in pregnancy and if you experience this, especially over your hands and feet, potentially worse at night, notify your doctors.

## 2025-04-04 ENCOUNTER — ROUTINE PRENATAL (OUTPATIENT)
Dept: OBGYN CLINIC | Facility: CLINIC | Age: 35
End: 2025-04-04
Payer: COMMERCIAL

## 2025-04-04 ENCOUNTER — ULTRASOUND (OUTPATIENT)
Facility: HOSPITAL | Age: 35
End: 2025-04-04
Payer: COMMERCIAL

## 2025-04-04 VITALS
HEIGHT: 67 IN | HEART RATE: 67 BPM | BODY MASS INDEX: 27.15 KG/M2 | DIASTOLIC BLOOD PRESSURE: 64 MMHG | WEIGHT: 173 LBS | SYSTOLIC BLOOD PRESSURE: 110 MMHG

## 2025-04-04 VITALS — WEIGHT: 174.6 LBS | SYSTOLIC BLOOD PRESSURE: 110 MMHG | BODY MASS INDEX: 27.35 KG/M2 | DIASTOLIC BLOOD PRESSURE: 70 MMHG

## 2025-04-04 DIAGNOSIS — Z36.4 ULTRASOUND FOR ANTENATAL SCREENING FOR FETAL GROWTH RESTRICTION: ICD-10-CM

## 2025-04-04 DIAGNOSIS — Z3A.33 33 WEEKS GESTATION OF PREGNANCY: Primary | ICD-10-CM

## 2025-04-04 DIAGNOSIS — Z23 ENCOUNTER FOR IMMUNIZATION: ICD-10-CM

## 2025-04-04 DIAGNOSIS — Z34.03 PRENATAL CARE, FIRST PREGNANCY IN THIRD TRIMESTER: Primary | ICD-10-CM

## 2025-04-04 PROCEDURE — 99213 OFFICE O/P EST LOW 20 MIN: CPT | Performed by: OBSTETRICS & GYNECOLOGY

## 2025-04-04 PROCEDURE — 76816 OB US FOLLOW-UP PER FETUS: CPT | Performed by: OBSTETRICS & GYNECOLOGY

## 2025-04-04 PROCEDURE — 90471 IMMUNIZATION ADMIN: CPT

## 2025-04-04 PROCEDURE — 90715 TDAP VACCINE 7 YRS/> IM: CPT

## 2025-04-04 PROCEDURE — PNV

## 2025-04-04 NOTE — PROGRESS NOTES
Please refer to the [facility] ultrasound report in Ob Procedures for additional information regarding today's visit

## 2025-04-04 NOTE — PROGRESS NOTES
OB/GYN  PN Visit  Isaura Ballesteros  [id number]  2025  10:19 AM  PATRICIA Aponte    S: 34 y.o.  33w4d here for PN visit.   She denies contractions. She denie leakage of fluid and vaginal bleeding.   She endorses good fetal movement.   She denies nausea, vomiting, headache, cramping, edema, and smoking.   Her pregnancy is uncomplicated.     O:  Pre-Devi Vitals      Flowsheet Row Most Recent Value   Prenatal Assessment    Fetal Heart Rate 135   Fundal Height (cm) 31 cm   Movement Present   Prenatal Vitals    Blood Pressure 110/70   Weight - Scale 79.2 kg (174 lb 9.6 oz)   Urine Albumin/Glucose    Dilation/Effacement/Station    Vaginal Drainage    Edema           Wt=79.2 kg (174 lb 9.6 oz); Body mass index is 27.35 kg/m².; TWG=12.1 kg (26 lb 9.6 oz)  Physical Exam    General: Well appearing, no distress  Respiratory: Unlabored breathing  Abdomen: Soft, gravid, nontender  Fundal Height: Appropriate for gestational age.   Extremities: Warm and well perfused.  Non tender.  OB exam completed: fundal height, +FHT.  Urine: -/-     A/P:    Problem List Items Addressed This Visit       Prenatal care in third trimester - Primary     Continue PNV  - Fetal kick counts reviewed  - Labs: UTD  -Pap: 24 - WNL (+) HRHPV type  neg; 24 - C/G neg  -Rh: pos  - Genetics: NIPS and msAFP neg  - Ultrasounds: f/u growth 32-34w - scheduled 25  - Tdap: 25  - Flu Shot: 24  - Rhogam: NA  - Delivery:  with epidural   - Contraception: likely POPs  - Breastfeeding: order for pump placed today  - Pediatrician: undecided - referral placed today  -Delivery Consent & Packet:given and consents signed previously  -GBS: at 36 weeks  -RTO 2 weeks          Other Visit Diagnoses         Encounter for immunization        Relevant Orders    Tdap Vaccine greater than or equal to 6yo (Completed)            PATRICIA Aponte  2025  10:19 AM

## 2025-04-04 NOTE — LETTER
April 4, 2025     Yadira Cloud MD  [address] [facility] [id number]    Patient: Isaura Ballesteros   YOB: 1990   Date of Visit: 4/4/2025       Dear Dr. Yadira Cloud MD:    Thank you for referring Isaura Ballesteros to me for evaluation. Below are my notes for this consultation.    If you have questions, please do not hesitate to call me. I look forward to following your patient along with you.         Sincerely,        Jann Acosta MD        CC: No Recipients    Jann Acosta MD  4/4/2025  2:22 PM  Sign when Signing Visit  Please refer to the [facility] ultrasound report in Ob Procedures for additional information regarding today's visit

## 2025-04-04 NOTE — ASSESSMENT & PLAN NOTE
Continue PNV  - Fetal kick counts reviewed  - Labs: UTD  -Pap: 24 - WNL (+) HRHPV type  neg; 24 - C/G neg  -Rh: pos  - Genetics: NIPS and msAFP neg  - Ultrasounds: f/u growth 32-34w - scheduled 25  - Tdap: 25  - Flu Shot: 24  - Rhogam: NA  - Delivery:  with epidural   - Contraception: likely POPs  - Breastfeeding: order for pump placed today  - Pediatrician: undecided - referral placed today  -Delivery Consent & Packet:given and consents signed previously  -GBS: at 36 weeks  -RTO 2 weeks

## 2025-04-14 ENCOUNTER — HOSPITAL ENCOUNTER (INPATIENT)
Facility: HOSPITAL | Age: 35
LOS: 3 days | Discharge: HOME | End: 2025-04-18
Attending: STUDENT IN AN ORGANIZED HEALTH CARE EDUCATION/TRAINING PROGRAM | Admitting: OBSTETRICS & GYNECOLOGY
Payer: COMMERCIAL

## 2025-04-14 ENCOUNTER — NURSE TRIAGE (OUTPATIENT)
Age: 35
End: 2025-04-14

## 2025-04-14 DIAGNOSIS — O36.8390 NON-REASSURING FETAL HEART RATE WITH LATE DECELERATION: ICD-10-CM

## 2025-04-14 DIAGNOSIS — Z98.891 STATUS POST PRIMARY LOW TRANSVERSE CESAREAN SECTION: ICD-10-CM

## 2025-04-14 DIAGNOSIS — O36.8390 NON-REASSURING FETAL HEART TONES COMPLICATING PREGNANCY, ANTEPARTUM: Primary | ICD-10-CM

## 2025-04-14 DIAGNOSIS — Z3A.35 35 WEEKS GESTATION OF PREGNANCY: ICD-10-CM

## 2025-04-14 LAB
ABO GROUP BLD: NORMAL
BASOPHILS # BLD AUTO: 0.03 THOUSANDS/ÂΜL (ref 0–0.1)
BASOPHILS NFR BLD AUTO: 0 % (ref 0–1)
BLD GP AB SCN SERPL QL: NEGATIVE
EOSINOPHIL # BLD AUTO: 0.02 THOUSAND/ÂΜL (ref 0–0.61)
EOSINOPHIL NFR BLD AUTO: 0 % (ref 0–6)
ERYTHROCYTE [DISTWIDTH] IN BLOOD BY AUTOMATED COUNT: 12.3 % (ref 11.6–15.1)
HCT VFR BLD AUTO: 34 % (ref 34.8–46.1)
HGB BLD-MCNC: 11.4 G/DL (ref 11.5–15.4)
HOLD SPECIMEN: NORMAL
IMM GRANULOCYTES # BLD AUTO: 0.05 THOUSAND/UL (ref 0–0.2)
IMM GRANULOCYTES NFR BLD AUTO: 1 % (ref 0–2)
LYMPHOCYTES # BLD AUTO: 1.37 THOUSANDS/ÂΜL (ref 0.6–4.47)
LYMPHOCYTES NFR BLD AUTO: 15 % (ref 14–44)
MCH RBC QN AUTO: 31.4 PG (ref 26.8–34.3)
MCHC RBC AUTO-ENTMCNC: 33.5 G/DL (ref 31.4–37.4)
MCV RBC AUTO: 94 FL (ref 82–98)
MONOCYTES # BLD AUTO: 0.61 THOUSAND/ÂΜL (ref 0.17–1.22)
MONOCYTES NFR BLD AUTO: 7 % (ref 4–12)
NEUTROPHILS # BLD AUTO: 7.04 THOUSANDS/ÂΜL (ref 1.85–7.62)
NEUTS SEG NFR BLD AUTO: 77 % (ref 43–75)
NRBC BLD AUTO-RTO: 0 /100 WBCS
PLATELET # BLD AUTO: 188 THOUSANDS/UL (ref 149–390)
PMV BLD AUTO: 11 FL (ref 8.9–12.7)
RBC # BLD AUTO: 3.63 MILLION/UL (ref 3.81–5.12)
RH BLD: POSITIVE
SPECIMEN EXPIRATION DATE: NORMAL
WBC # BLD AUTO: 9.12 THOUSAND/UL (ref 4.31–10.16)

## 2025-04-14 PROCEDURE — 86900 BLOOD TYPING SEROLOGIC ABO: CPT

## 2025-04-14 PROCEDURE — G0463 HOSPITAL OUTPT CLINIC VISIT: HCPCS

## 2025-04-14 PROCEDURE — 86850 RBC ANTIBODY SCREEN: CPT

## 2025-04-14 PROCEDURE — 86780 TREPONEMA PALLIDUM: CPT | Performed by: STUDENT IN AN ORGANIZED HEALTH CARE EDUCATION/TRAINING PROGRAM

## 2025-04-14 PROCEDURE — NC001 PR NO CHARGE: Performed by: STUDENT IN AN ORGANIZED HEALTH CARE EDUCATION/TRAINING PROGRAM

## 2025-04-14 PROCEDURE — NC001 PR NO CHARGE: Performed by: OBSTETRICS & GYNECOLOGY

## 2025-04-14 PROCEDURE — 86901 BLOOD TYPING SEROLOGIC RH(D): CPT

## 2025-04-14 PROCEDURE — 85025 COMPLETE CBC W/AUTO DIFF WBC: CPT

## 2025-04-14 PROCEDURE — 99214 OFFICE O/P EST MOD 30 MIN: CPT | Performed by: OBSTETRICS & GYNECOLOGY

## 2025-04-14 PROCEDURE — 99213 OFFICE O/P EST LOW 20 MIN: CPT

## 2025-04-14 RX ORDER — SODIUM CHLORIDE, SODIUM LACTATE, POTASSIUM CHLORIDE, CALCIUM CHLORIDE 600; 310; 30; 20 MG/100ML; MG/100ML; MG/100ML; MG/100ML
125 INJECTION, SOLUTION INTRAVENOUS CONTINUOUS
Status: DISCONTINUED | OUTPATIENT
Start: 2025-04-14 | End: 2025-04-15

## 2025-04-14 RX ADMIN — SODIUM CHLORIDE, SODIUM LACTATE, POTASSIUM CHLORIDE, AND CALCIUM CHLORIDE 125 ML/HR: .6; .31; .03; .02 INJECTION, SOLUTION INTRAVENOUS at 20:13

## 2025-04-14 RX ADMIN — Medication 1 TABLET: at 17:56

## 2025-04-14 NOTE — PROGRESS NOTES
I saw Isaura Ballesteros at the bedside she is a 34 y.o.  at 35w0d who was admitted to observation overnight due to two spontaneous prolonged decelerations on monitoring when she presented for r/o ROM. She was ruled out for ROM and  labor but stays for continuous fetal monitoring due to the tracing findings. A BPP was otherwise 8 out of 10. Recommend continuous fetal monitoring overnight. We discussed the option of ALPS betamethasone and risks vs potential benefits. I advise a course of betamethasone be initiated overnight if she were to have further decelerations overnight. Plan to defer for now if fetal status remains reassuring/Category 1. She had a recently normal growth US on  showing an EFW at the 19%ile. Full MFM consult note by Dr Lerner is forthcoming    Aysha Daley MD  Attending Physician, Maternal-Fetal Medicine  [facility]

## 2025-04-14 NOTE — TELEPHONE ENCOUNTER
"FOLLOW UP: ESC to on call Dr. Cloud - recommends triage eval. Patient aware and agreeable. ESC to unit charge RN.    REASON FOR CONVERSATION: Vaginal Discharge    SYMPTOMS: Patient is 35w0d  calling to report concern for possible LOF. Reports started 2 days ago - feeling a couple episodes of small gushes of fluid. Report fluid is clear with a very mild odor, not saturating underwear. Good fetal movement. Denies any vaginal bleeding or contractions, swelling, or abdominal pain    OTHER:     DISPOSITION: Discuss with Provider and Call Back Patient, Call Transferred to PCP Now      Reason for Disposition   Possible incontinence of urine, BUT patient uncertain    Answer Assessment - Initial Assessment Questions  1. ONSET: \"When did you notice the fluid coming out of your vagina?\"         Small gushes throughout the day x 2 days - clear fluid, possible slight odor - notes discharge with wetness  2. CONTRACTIONS: \"Are you having any contractions?\" If Yes, ask: \"Describe the contractions that you are having.\" (e.g., duration, frequency, regularity, severity)      Denies  3. HAMMAD: \"What date are you expecting to deliver?\"      2025  4. PARITY: \"Have you had a baby before?\" If Yes, ask: \"How long did the labor last?\"      primip  5. FETAL MOVEMENT: \"Has the baby's movement decreased or changed significantly from normal?\"      Good fetal movement  6. OTHER SYMPTOMS: \"Do you have any other symptoms?\" (e.g., abdomen pain, fever, hand or face swelling, vaginal bleeding)      Denies vaginal bleeding, contraction, swelling, abdominal pain, fever    Protocols used: Pregnancy - Rupture of Membranes Suspected-Adult-OH    "

## 2025-04-14 NOTE — ASSESSMENT & PLAN NOTE
Growth Scan from 04/04 at 33w4d - EFW  2031 grams - 4 lbs 8 oz    (19%)  Continue Prenatal vitamins    Plan:   Continuous fetal monitoring

## 2025-04-14 NOTE — ASSESSMENT & PLAN NOTE
Presented to triage initially for leakage of fluid and PPROM workup was noted to be negative  but then had two prolonged  late decelerations on NST and was subsequently admitted for observation  -Decels at 1103 and 1245   -SVE 0.5/0/-4, infrequent irregular contractions, low suspicion for  labor at this time    Plan:   Multiple prolonged decelerations are concerning for possible placental insufficiency. Therefore continued observation for observation overnight with continuous fetal monitoring is recommended  Fetal  lung maturity : Plan to offer Betamethasone  as administration to pts at risk for   delivery significantly reduces the rate of  respiratory complications and it is strongly recommended prior to 34 wks. ACOG and SMFM also recommend offering BTM in the late  GA 34-36. > pt declines at this time 04/15 AM  Recommend repeat SVE if pt reports more contractions  Repeat BPP and umbilical cord doppler 4/15 AM

## 2025-04-14 NOTE — ASSESSMENT & PLAN NOTE
Isaura is a 35yo G1 at 35w1d admitted for obs in setting of NRFHT.   Presented to triage initially for r/o ROM which was negative. TAYLOR normal at 15.08cm  Initially presented with one late deceleration followed by a prolonged 2-3 minute deceleration. Continued to have intermittent prolonged decelerations overnight.     Plan:  Continuous fetal monitoring  Consider BTM per shared-decision making  Regular diet order  MFM consult, recommendations appreciated

## 2025-04-14 NOTE — CONSULTS
Consultation - Maternal Fetal Medicine   Isaura Ballesteros 34 y.o. female MRN: [id number]  Unit/Bed#: -01 Encounter: [id number]  Admit date: 2025.  Today's date: 25    Assessment & Plan   Ms. Ballesteros is a 34 y.o. year-old  at 35w0d, Hospital Day: 1, admitted for observation and extended fetal monitoring secondary to prolonged spontaneous late decelerations noted on NST.  By issue:    Non-reassuring fetal heart tones complicating pregnancy, antepartum  Assessment & Plan  Presented to triage initially for leakage of fluid and PPROM workup was noted to be negative  but then had two prolonged  late decelerations on NST and was subsequently admitted for observation  -Decels at 1103 and 1245   -SVE 0.5/0/-4, infrequent irregular contractions, low suspicion for  labor at this time    Plan:   Multiple prolonged decelerations are concerning for possible placental insufficiency. Therefore continued observation for observation overnight with continuous fetal monitoring is recommended  Fetal  lung maturity : Plan to offer Betamethasone  as administration to pts at risk for   delivery significantly reduces the rate of  respiratory complications and it is strongly recommended prior to 34 wks. ACOG and SMFM also recommend offering BTM in the late  GA 34-36.   Recommend repeat SVE if pt reports more contractions        * 35 weeks gestation of pregnancy  Assessment & Plan   Growth Scan from  at 33w4d - EFW  2031 grams - 4 lbs 8 oz    (19%)  Continue Prenatal vitamins    Plan:   Continuous fetal monitoring overnight             Chief Complaint   Patient presents with    Rupture of Membranes     Increase of clear vaginal leaking since Saturday      Discussed case with Dr. Daley      Physician Requesting Consult: Yadira Cloud MD  Reason for Consult / Principal Problem: Non-reassuring fetal heart tracing  Subspeciality: Perinatology    Dear Dr. Cloud,    Thank you for  referring patient Isaura Ballesteros for Maternal-Fetal Medicine consultation regarding non-reassuring fetal heart tracing.  HPI: As you know, Ms. Ballesteros is a 34 y.o. year-old  with an HAMMAD of Estimated Date of Delivery: 25 at 35w0d, Hospital Day: 1, admitted for Observation and extended fetal monitoring in the setting of two spontaneous prolonged decelerations noted on NST.  Her current pregnancy is significant for .  Her obstetrical history: NA, first pregnancy.      Other obstetric review of symptoms:  Contractions: None.    Leakage of fluid: endorses  Bleeding: None.    Fetal movement: present.      Review of Systems   Constitutional:  Negative for chills and fever.   HENT:  Negative for ear pain and sore throat.    Eyes:  Negative for pain and visual disturbance.   Respiratory:  Negative for cough and shortness of breath.    Cardiovascular:  Negative for chest pain and palpitations.   Gastrointestinal:  Negative for abdominal pain and vomiting.   Genitourinary:  Negative for dysuria and hematuria.   Musculoskeletal:  Negative for arthralgias and back pain.   Skin:  Negative for color change and rash.   Neurological:  Negative for seizures and syncope.   All other systems reviewed and are negative.      Other history is as follows:    Historical Information   OB History    Para Term  AB Living   1 0 0 0 0 0   SAB IAB Ectopic Multiple Live Births   0 0 0 0 0      # Outcome Date GA Lbr Judah/2nd Weight Sex Type Anes PTL Lv   1 Current              Gynecologic history: Patient's last menstrual period was 2024 (exact date).     Past Medical History:   Diagnosis Date    Acne vulgaris 2021    Asthma     childhood - no inhaler use    Diarrhea 2021    Encounter for long-term current use of medication 2021    Irregular menses 2021    Pigmented nevus 2021    Abdomen 15x7 mm Lower right back 10x8 mm Upper left back=2 nevi    Varicella     pt had chickenpox in  childhood     Past Surgical History:   Procedure Laterality Date    APPENDECTOMY      MOLE REMOVAL      2 - both benign     Social History   Social History     Substance and Sexual Activity   Alcohol Use Not Currently    Alcohol/week: 12.0 standard drinks of alcohol    Types: 8 Glasses of wine, 4 Standard drinks or equivalent per week     Social History     Substance and Sexual Activity   Drug Use Not Currently    Types: Marijuana    Comment: pt has inactive medical marijuana care     Social History     Tobacco Use   Smoking Status Never   Smokeless Tobacco Never     Family History: Family history non-contributory    Meds/Allergies   Prior to Admission Medications   Prescriptions Last Dose Informant Patient Reported? Taking?   Prenatal Vit-Fe Fumarate-FA (PRENATAL PO) 4/13/2025  Yes Yes   Sig: Take by mouth   clindamycin (CLEOCIN T) 1 % lotion 4/13/2025  Yes Yes   Sig: APPLY TO FACE IN THE MORNING   melatonin 1 MG CHEW 4/13/2025  Yes Yes   Sig: Chew daily at bedtime      Facility-Administered Medications: None     Medication Administration - last 24 hours from 04/13/2025 1820 to 04/14/2025 1820         Date/Time Order Dose Route Action Action by     04/14/2025 1756 EDT prenatal multivitamin tablet 1 tablet 1 tablet Oral Given Piotr Grimes, RN            Current Facility-Administered Medications:     prenatal multivitamin tablet 1 tablet, Daily  Allergies   Allergen Reactions    Lactose - Food Allergy Other (See Comments)     Lactose intol       Objective    Patient Vitals for the past 24 hrs:   BP Temp Temp src Pulse Resp SpO2   04/14/25 1640 113/72 97.9 °F (36.6 °C) Oral 73 17 100 %   04/14/25 1045 -- (!) 97.4 °F (36.3 °C) Oral -- 18 --     Vitals: Blood pressure 113/72, pulse 73, temperature 97.9 °F (36.6 °C), temperature source Oral, resp. rate 17, last menstrual period 08/12/2024, SpO2 100%, not currently breastfeeding.There is no height or weight on file to calculate BMI.    Physical  "Exam  Constitutional:       Appearance: Normal appearance.   HENT:      Head: Normocephalic and atraumatic.   Eyes:      Extraocular Movements: Extraocular movements intact.   Cardiovascular:      Pulses: Normal pulses.   Pulmonary:      Effort: Pulmonary effort is normal.   Abdominal:      Tenderness: There is no abdominal tenderness. There is no guarding or rebound.   Musculoskeletal:         General: Normal range of motion.      Cervical back: Normal range of motion.   Skin:     General: Skin is warm and dry.   Neurological:      General: No focal deficit present.      Mental Status: She is alert.   Psychiatric:         Mood and Affect: Mood normal.         Behavior: Behavior normal.       Dilation: 0.5cm, Effacement:  0%, and Station:  -4    Prenatal Labs: I have personally reviewed pertinent reports.  > within normal limits      Results from last 7 days   Lab Units 04/14/25  1658   WBC Thousand/uL 9.12   TOTAL NEUT ABS Thousands/µL 7.04   HEMOGLOBIN g/dL 11.4*   MCV fL 94   PLATELETS Thousands/uL 188     Results from last 7 days   Lab Units 04/14/25  1658   SEGS PCT % 77*   MONO PCT % 7   EOS PCT % 0           Invalid input(s): \"GLU\", \"CA\"      Results from last 7 days   Lab Units 04/14/25  1658   PLATELETS Thousands/uL 188                           Fetal data:  Nonstress test: date 04/14/25 Time: 1424 - 1444  Baseline: 140bpm  Variability: moderate  Accelerations: present, 15x15  Decelerations: absent  Contractions: absent  Assessment: reactive  Plan: continuous    MFM ultrasound report key findings: level II scan from 04/04/2025 date at 33w4d gestational age.  AC             28.61 cm        32 weeks 4 days* (26%)  BPD             8.85 cm        35 weeks 6 days* (94%)  HC             30.04 cm        33 weeks 2 days* (11%)  Femur           6.11 cm        31 weeks 5 days* (5%)     HC/AC           1.05 [0.96 - 1.11]                 (56%)  FL/AC             21 [20 - 24]  FL/BPD            69 [71 - 87]  EFW Hadlock " 4   2031 grams - 4 lbs 8 oz                 (19%)     THE AVERAGE GESTATIONAL AGE is 33 weeks 3 days +/- 21 days.     AMNIOTIC FLUID     Q1: 4.6      Q2: 5.3      Q3: 6.9      Q4: 5.0  TAYLOR Total = 21.8 cm  Amniotic Fluid: Normal  Imaging, EKG, Pathology, and Other Studies: Results Review Statement: I reviewed radiology reports from this admission including: Ultrasound(s).          Yasmin Lerner MD  4/14/2025  6:20 PM

## 2025-04-14 NOTE — PLAN OF CARE
Problem: ANTEPARTUM  Goal: Maintain pregnancy as long as maternal and/or fetal condition is stable  Description: INTERVENTIONS:- Maternal surveillance- Fetal surveillance- Monitor uterine activity- Medications as ordered- Bedrest  Outcome: Progressing     Problem: PAIN - ADULT  Goal: Verbalizes/displays adequate comfort level or baseline comfort level  Description: Interventions:- Encourage patient to monitor pain and request assistance- Assess pain using appropriate pain scale- Administer analgesics based on type and severity of pain and evaluate response- Implement non-pharmacological measures as appropriate and evaluate response- Consider cultural and social influences on pain and pain management- Notify physician/advanced practitioner if interventions unsuccessful or patient reports new pain  Outcome: Progressing     Problem: INFECTION - ADULT  Goal: Absence or prevention of progression during hospitalization  Description: INTERVENTIONS:- Assess and monitor for signs and symptoms of infection- Monitor lab/diagnostic results- Monitor all insertion sites, i.e. indwelling lines, tubes, and drains- Monitor endotracheal if appropriate and nasal secretions for changes in amount and color- Bulger appropriate cooling/warming therapies per order- Administer medications as ordered- Instruct and encourage patient and family to use good hand hygiene technique- Identify and instruct in appropriate isolation precautions for identified infection/condition  Outcome: Progressing  Goal: Absence of fever/infection during neutropenic period  Description: INTERVENTIONS:- Monitor WBC  Outcome: Progressing     Problem: SAFETY ADULT  Goal: Patient will remain free of falls  Description: INTERVENTIONS:- Educate patient/family on patient safety including physical limitations- Instruct patient to call for assistance with activity - Consult OT/PT to assist with strengthening/mobility - Keep Call bell within reach- Keep bed low and  locked with side rails adjusted as appropriate- Keep care items and personal belongings within reach- Initiate and maintain comfort rounds- Make Fall Risk Sign visible to staff- Offer Toileting every  Hours, in advance of need- Initiate/Maintain alarm- Obtain necessary fall risk management equipment: - Apply yellow socks and bracelet for high fall risk patients- Consider moving patient to room near nurses station  Outcome: Progressing  Goal: Maintain or return to baseline ADL function  Description: INTERVENTIONS:-  Assess patient's ability to carry out ADLs; assess patient's baseline for ADL function and identify physical deficits which impact ability to perform ADLs (bathing, care of mouth/teeth, toileting, grooming, dressing, etc.)- Assess/evaluate cause of self-care deficits - Assess range of motion- Assess patient's mobility; develop plan if impaired- Assess patient's need for assistive devices and provide as appropriate- Encourage maximum independence but intervene and supervise when necessary- Involve family in performance of ADLs- Assess for home care needs following discharge - Consider OT consult to assist with ADL evaluation and planning for discharge- Provide patient education as appropriate  Outcome: Progressing  Goal: Maintains/Returns to pre admission functional level  Description: INTERVENTIONS:- Perform AM-PAC 6 Click Basic Mobility/ Daily Activity assessment daily.- Set and communicate daily mobility goal to care team and patient/family/caregiver. - Collaborate with rehabilitation services on mobility goals if consulted- Perform Range of Motion  times a day.- Reposition patient every  hours.- Dangle patient  times a day- Stand patient  times a day- Ambulate patient  times a day- Out of bed to chair  times a day - Out of bed for meals  times a day- Out of bed for toileting- Record patient progress and toleration of activity level   Outcome: Progressing     Problem: Knowledge Deficit  Goal:  Patient/family/caregiver demonstrates understanding of disease process, treatment plan, medications, and discharge instructions  Description: Complete learning assessment and assess knowledge base.Interventions:- Provide teaching at level of understanding- Provide teaching via preferred learning methods  Outcome: Progressing     Problem: DISCHARGE PLANNING  Goal: Discharge to home or other facility with appropriate resources  Description: INTERVENTIONS:- Identify barriers to discharge w/patient and caregiver- Arrange for needed discharge resources and transportation as appropriate- Identify discharge learning needs (meds, wound care, etc.)- Arrange for interpretive services to assist at discharge as needed- Refer to Case Management Department for coordinating discharge planning if the patient needs post-hospital services based on physician/advanced practitioner order or complex needs related to functional status, cognitive ability, or social support system  Outcome: Progressing

## 2025-04-14 NOTE — H&P
H & P- Obstetrics   Isaura Ballesteros 34 y.o. female MRN: [id number]  Unit/Bed#: LD TRIAGE  Encounter: [id number]    Assessment: 34 y.o.  at 35w0d admitted for observation for non-reassuring fetal heart tones.     Plan:   * Non-reassuring fetal heart tones complicating pregnancy, antepartum  Assessment & Plan  Presented to triage initially for r/o ROM   ROM w/u negative, TAYLOR normal at 15.08cm  Initially presented with one late deceleration followed by a prolonged 2-3 minute deceleration    Plan:  Admit for observation  Continuous monitoring until midnight if tracing cat 1  Consider BTM per shared-decision making  Regular diet order          Discussed case and plan w/ Dr. Cloud      Chief Complaint: leaking fluid    HPI: Isaura Ballesteros is a 34 y.o.  with an HAMMAD of 2025, by Last Menstrual Period at 35w0d who is being admitted for observation for non-reassuring fetal heart tones. She denies having or feeling her uterine contractions, has no LOF and moderate LOF, and reports no VB. She states she has felt good FM. She     Patient Active Problem List   Diagnosis    Pigmented nevus    Acne vulgaris    Atypical nevus of abdominal wall    31 weeks gestation of pregnancy    Prenatal care in third trimester    Non-reassuring fetal heart tones complicating pregnancy, antepartum       Baby complications/comments: none    Review of Systems   Constitutional:  Negative for chills and fever.   HENT:  Negative for ear pain and sore throat.    Eyes:  Negative for pain and visual disturbance.   Respiratory:  Negative for cough and shortness of breath.    Cardiovascular:  Negative for chest pain and palpitations.   Gastrointestinal:  Negative for abdominal pain and vomiting.   Genitourinary:  Negative for dysuria, hematuria, vaginal bleeding, vaginal discharge and vaginal pain.   Musculoskeletal:  Negative for arthralgias and back pain.   Skin:  Negative for color change and rash.   Neurological:  Negative  for seizures and syncope.   Psychiatric/Behavioral:  The patient is not nervous/anxious.    All other systems reviewed and are negative.      OB Hx:  OB History    Para Term  AB Living   1 0 0 0 0 0   SAB IAB Ectopic Multiple Live Births   0 0 0 0 0      # Outcome Date GA Lbr Judah/2nd Weight Sex Type Anes PTL Lv   1 Current                Past Medical Hx:  Past Medical History:   Diagnosis Date    Acne vulgaris 2021    Asthma     childhood - no inhaler use    Diarrhea 2021    Encounter for long-term current use of medication 2021    Irregular menses 2021    Pigmented nevus 2021    Abdomen 15x7 mm Lower right back 10x8 mm Upper left back=2 nevi    Varicella     pt had chickenpox in childhood       Past Surgical hx:  Past Surgical History:   Procedure Laterality Date    APPENDECTOMY      MOLE REMOVAL      2 - both benign       Social Hx:  Alcohol use: denies  Tobacco use: denies  Other substance use: denies    Allergies   Allergen Reactions    Lactose - Food Allergy Other (See Comments)     Lactose intol         Medications Prior to Admission:     clindamycin (CLEOCIN T) 1 % lotion    melatonin 1 MG CHEW    Prenatal Vit-Fe Fumarate-FA (PRENATAL PO)    Objective:  Temp:  [97.4 °F (36.3 °C)] 97.4 °F (36.3 °C)  Resp:  [18] 18  There is no height or weight on file to calculate BMI.     Physical Exam:  Physical Exam  Constitutional:       Appearance: Normal appearance.   Genitourinary:      Genitourinary Comments: Speculum: cervical os visually closed, no pooling noted, no vaginal bleeding or abnormal discharge noted  SVE: 0.5/0/-4   HENT:      Head: Normocephalic and atraumatic.      Mouth/Throat:      Pharynx: Oropharynx is clear.   Eyes:      General: No scleral icterus.     Extraocular Movements: Extraocular movements intact.   Cardiovascular:      Rate and Rhythm: Normal rate and regular rhythm.      Pulses: Normal pulses.   Pulmonary:      Effort: Pulmonary effort is normal.  No respiratory distress.   Abdominal:      Tenderness: There is no abdominal tenderness.      Comments: Gravid   Musculoskeletal:      Right lower leg: No edema.      Left lower leg: No edema.   Neurological:      General: No focal deficit present.      Mental Status: She is alert and oriented to person, place, and time.   Skin:     General: Skin is warm and dry.   Psychiatric:         Mood and Affect: Mood normal.         Behavior: Behavior normal.         Thought Content: Thought content normal.         Judgment: Judgment normal.   Vitals and nursing note reviewed. Exam conducted with a chaperone present.            FHT:  Baseline Rate (FHR): 140 bpm  Variability: Moderate  Accelerations: 15 x 15 or greater  Decelerations: (!) Present, Prolonged >2 minutes, Daryl not <80 bpm  FHR Category: Category II    TOCO:        Lab Results   Component Value Date    WBC 10.20 (H) 02/13/2025    HGB 11.9 02/13/2025    HCT 34.4 (L) 02/13/2025     02/13/2025     Lab Results   Component Value Date    K 3.9 01/27/2023     (H) 01/27/2023    CO2 24 01/27/2023    BUN 12 01/27/2023    CREATININE 0.74 01/27/2023    AST 14 01/27/2023    ALT 28 01/27/2023       Prenatal Labs: Reviewed      Blood type: O positive  Antibody: negative  GBS: unknown  HIV: Non-reactive  Rubella: Immune  Syphilis IgM/IgG: Non-reactive  HBsAg: Non-reactive  HCAb: Non-reactive  Chlamydia: Negative  Gonorrhea: Negative  Diabetes 1 hour screen: normal   3 hour glucose: not indicated  Platelets: 220k  Hgb: 11.9        >2 Midnights  INPATIENT     Signature/Title: Choe No MD  Date: 4/14/2025  Time: 1:27 PM

## 2025-04-15 ENCOUNTER — ANESTHESIA EVENT (OUTPATIENT)
Dept: ANESTHESIOLOGY | Facility: HOSPITAL | Age: 35
End: 2025-04-15

## 2025-04-15 ENCOUNTER — ANESTHESIA EVENT (INPATIENT)
Dept: LABOR AND DELIVERY | Facility: HOSPITAL | Age: 35
End: 2025-04-15
Payer: COMMERCIAL

## 2025-04-15 ENCOUNTER — ANESTHESIA (OUTPATIENT)
Dept: ANESTHESIOLOGY | Facility: HOSPITAL | Age: 35
End: 2025-04-15

## 2025-04-15 ENCOUNTER — ANESTHESIA (INPATIENT)
Dept: LABOR AND DELIVERY | Facility: HOSPITAL | Age: 35
End: 2025-04-15
Payer: COMMERCIAL

## 2025-04-15 PROBLEM — Z98.891 STATUS POST PRIMARY LOW TRANSVERSE CESAREAN SECTION: Status: ACTIVE | Noted: 2024-11-11

## 2025-04-15 LAB
BASE EXCESS BLDCOA CALC-SCNC: -2.7 MMOL/L (ref 3–11)
BASE EXCESS BLDCOV CALC-SCNC: -2.6 MMOL/L (ref 1–9)
EXTERNAL GROUP B STREP ANTIGEN: NORMAL
HCO3 BLDCOA-SCNC: 24.3 MMOL/L (ref 17.3–27.3)
HCO3 BLDCOV-SCNC: 23.1 MMOL/L (ref 12.2–28.6)
O2 CT VFR BLDCOA CALC: 7.4 ML/DL
OXYHGB MFR BLDCOA: 32.2 %
OXYHGB MFR BLDCOV: 31.8 %
PCO2 BLDCOA: 50.1 MM[HG] (ref 30–60)
PCO2 BLDCOV: 43.2 MM HG (ref 27–43)
PH BLDCOA: 7.3 [PH] (ref 7.23–7.43)
PH BLDCOV: 7.35 [PH] (ref 7.19–7.49)
PO2 BLDCOA: 15.3 MM HG (ref 5–25)
PO2 BLDCOV: 15.2 MM HG (ref 15–45)
SAO2 % BLDCOV: 6.5 ML/DL
TREPONEMA PALLIDUM IGG+IGM AB [PRESENCE] IN SERUM OR PLASMA BY IMMUNOASSAY: NORMAL

## 2025-04-15 PROCEDURE — 82805 BLOOD GASES W/O2 SATURATION: CPT | Performed by: OBSTETRICS & GYNECOLOGY

## 2025-04-15 PROCEDURE — NC001 PR NO CHARGE: Performed by: OBSTETRICS & GYNECOLOGY

## 2025-04-15 PROCEDURE — 59510 CESAREAN DELIVERY: CPT | Performed by: OBSTETRICS & GYNECOLOGY

## 2025-04-15 PROCEDURE — 88307 TISSUE EXAM BY PATHOLOGIST: CPT | Performed by: PATHOLOGY

## 2025-04-15 PROCEDURE — 76818 FETAL BIOPHYS PROFILE W/NST: CPT | Performed by: OBSTETRICS & GYNECOLOGY

## 2025-04-15 PROCEDURE — 4A1HXCZ MONITORING OF PRODUCTS OF CONCEPTION, CARDIAC RATE, EXTERNAL APPROACH: ICD-10-PCS | Performed by: OBSTETRICS & GYNECOLOGY

## 2025-04-15 PROCEDURE — 99232 SBSQ HOSP IP/OBS MODERATE 35: CPT | Performed by: OBSTETRICS & GYNECOLOGY

## 2025-04-15 RX ORDER — OXYTOCIN/RINGER'S LACTATE 30/500 ML
PLASTIC BAG, INJECTION (ML) INTRAVENOUS CONTINUOUS PRN
Status: DISCONTINUED | OUTPATIENT
Start: 2025-04-15 | End: 2025-04-15

## 2025-04-15 RX ORDER — DOCUSATE SODIUM 100 MG/1
100 CAPSULE, LIQUID FILLED ORAL 2 TIMES DAILY
Status: DISCONTINUED | OUTPATIENT
Start: 2025-04-15 | End: 2025-04-18 | Stop reason: HOSPADM

## 2025-04-15 RX ORDER — METOCLOPRAMIDE HYDROCHLORIDE 5 MG/ML
10 INJECTION INTRAMUSCULAR; INTRAVENOUS ONCE
Status: COMPLETED | OUTPATIENT
Start: 2025-04-15 | End: 2025-04-15

## 2025-04-15 RX ORDER — ACETAMINOPHEN 325 MG/1
975 TABLET ORAL EVERY 6 HOURS SCHEDULED
Status: DISCONTINUED | OUTPATIENT
Start: 2025-04-15 | End: 2025-04-18 | Stop reason: HOSPADM

## 2025-04-15 RX ORDER — HYDROMORPHONE HCL/PF 1 MG/ML
0.5 SYRINGE (ML) INJECTION
Status: DISCONTINUED | OUTPATIENT
Start: 2025-04-15 | End: 2025-04-18 | Stop reason: HOSPADM

## 2025-04-15 RX ORDER — OXYTOCIN/RINGER'S LACTATE 30/500 ML
PLASTIC BAG, INJECTION (ML) INTRAVENOUS AS NEEDED
Status: DISCONTINUED | OUTPATIENT
Start: 2025-04-15 | End: 2025-04-15

## 2025-04-15 RX ORDER — SODIUM CHLORIDE, SODIUM LACTATE, POTASSIUM CHLORIDE, CALCIUM CHLORIDE 600; 310; 30; 20 MG/100ML; MG/100ML; MG/100ML; MG/100ML
INJECTION, SOLUTION INTRAVENOUS CONTINUOUS PRN
Status: DISCONTINUED | OUTPATIENT
Start: 2025-04-15 | End: 2025-04-15

## 2025-04-15 RX ORDER — FENTANYL CITRATE/PF 50 MCG/ML
25 SYRINGE (ML) INJECTION
Status: DISCONTINUED | OUTPATIENT
Start: 2025-04-15 | End: 2025-04-18 | Stop reason: HOSPADM

## 2025-04-15 RX ORDER — CALCIUM CARBONATE 500 MG/1
1000 TABLET, CHEWABLE ORAL DAILY PRN
Status: DISCONTINUED | OUTPATIENT
Start: 2025-04-15 | End: 2025-04-18 | Stop reason: HOSPADM

## 2025-04-15 RX ORDER — SODIUM CHLORIDE, SODIUM LACTATE, POTASSIUM CHLORIDE, CALCIUM CHLORIDE 600; 310; 30; 20 MG/100ML; MG/100ML; MG/100ML; MG/100ML
125 INJECTION, SOLUTION INTRAVENOUS CONTINUOUS
Status: DISCONTINUED | OUTPATIENT
Start: 2025-04-15 | End: 2025-04-18 | Stop reason: HOSPADM

## 2025-04-15 RX ORDER — IBUPROFEN 600 MG/1
600 TABLET, FILM COATED ORAL EVERY 6 HOURS
Status: DISCONTINUED | OUTPATIENT
Start: 2025-04-17 | End: 2025-04-18 | Stop reason: HOSPADM

## 2025-04-15 RX ORDER — CEFAZOLIN SODIUM 2 G/50ML
2000 SOLUTION INTRAVENOUS ONCE
Status: COMPLETED | OUTPATIENT
Start: 2025-04-15 | End: 2025-04-15

## 2025-04-15 RX ORDER — METOCLOPRAMIDE HYDROCHLORIDE 5 MG/ML
10 INJECTION INTRAMUSCULAR; INTRAVENOUS ONCE AS NEEDED
Status: DISCONTINUED | OUTPATIENT
Start: 2025-04-15 | End: 2025-04-18 | Stop reason: HOSPADM

## 2025-04-15 RX ORDER — OXYCODONE HYDROCHLORIDE 10 MG/1
10 TABLET ORAL EVERY 4 HOURS PRN
Status: DISCONTINUED | OUTPATIENT
Start: 2025-04-16 | End: 2025-04-18 | Stop reason: HOSPADM

## 2025-04-15 RX ORDER — NALOXONE HYDROCHLORIDE 0.4 MG/ML
0.1 INJECTION, SOLUTION INTRAMUSCULAR; INTRAVENOUS; SUBCUTANEOUS
Status: ACTIVE | OUTPATIENT
Start: 2025-04-15 | End: 2025-04-16

## 2025-04-15 RX ORDER — ONDANSETRON 2 MG/ML
INJECTION INTRAMUSCULAR; INTRAVENOUS AS NEEDED
Status: DISCONTINUED | OUTPATIENT
Start: 2025-04-15 | End: 2025-04-15

## 2025-04-15 RX ORDER — ONDANSETRON 2 MG/ML
4 INJECTION INTRAMUSCULAR; INTRAVENOUS ONCE AS NEEDED
Status: DISCONTINUED | OUTPATIENT
Start: 2025-04-15 | End: 2025-04-18 | Stop reason: HOSPADM

## 2025-04-15 RX ORDER — OXYCODONE HYDROCHLORIDE 5 MG/1
5 TABLET ORAL EVERY 4 HOURS PRN
Status: DISCONTINUED | OUTPATIENT
Start: 2025-04-16 | End: 2025-04-18 | Stop reason: HOSPADM

## 2025-04-15 RX ORDER — MORPHINE SULFATE 0.5 MG/ML
INJECTION, SOLUTION EPIDURAL; INTRATHECAL; INTRAVENOUS AS NEEDED
Status: DISCONTINUED | OUTPATIENT
Start: 2025-04-15 | End: 2025-04-15

## 2025-04-15 RX ORDER — ONDANSETRON 2 MG/ML
4 INJECTION INTRAMUSCULAR; INTRAVENOUS EVERY 6 HOURS PRN
Status: ACTIVE | OUTPATIENT
Start: 2025-04-15 | End: 2025-04-16

## 2025-04-15 RX ORDER — FENTANYL CITRATE 50 UG/ML
INJECTION, SOLUTION INTRAMUSCULAR; INTRAVENOUS AS NEEDED
Status: DISCONTINUED | OUTPATIENT
Start: 2025-04-15 | End: 2025-04-15

## 2025-04-15 RX ORDER — BUPIVACAINE HYDROCHLORIDE 7.5 MG/ML
INJECTION, SOLUTION INTRASPINAL AS NEEDED
Status: DISCONTINUED | OUTPATIENT
Start: 2025-04-15 | End: 2025-04-15

## 2025-04-15 RX ORDER — KETOROLAC TROMETHAMINE 30 MG/ML
INJECTION, SOLUTION INTRAMUSCULAR; INTRAVENOUS AS NEEDED
Status: DISCONTINUED | OUTPATIENT
Start: 2025-04-15 | End: 2025-04-15

## 2025-04-15 RX ORDER — OXYTOCIN/RINGER'S LACTATE 30/500 ML
62.5 PLASTIC BAG, INJECTION (ML) INTRAVENOUS ONCE
Status: COMPLETED | OUTPATIENT
Start: 2025-04-15 | End: 2025-04-16

## 2025-04-15 RX ORDER — HYDROMORPHONE HCL/PF 1 MG/ML
0.5 SYRINGE (ML) INJECTION EVERY 2 HOUR PRN
Refills: 0 | Status: DISCONTINUED | OUTPATIENT
Start: 2025-04-15 | End: 2025-04-18 | Stop reason: HOSPADM

## 2025-04-15 RX ORDER — DEXAMETHASONE SODIUM PHOSPHATE 10 MG/ML
INJECTION, SOLUTION INTRAMUSCULAR; INTRAVENOUS AS NEEDED
Status: DISCONTINUED | OUTPATIENT
Start: 2025-04-15 | End: 2025-04-15

## 2025-04-15 RX ORDER — NALBUPHINE HYDROCHLORIDE 10 MG/ML
5 INJECTION INTRAMUSCULAR; INTRAVENOUS; SUBCUTANEOUS
Status: ACTIVE | OUTPATIENT
Start: 2025-04-15 | End: 2025-04-16

## 2025-04-15 RX ORDER — KETOROLAC TROMETHAMINE 30 MG/ML
30 INJECTION, SOLUTION INTRAMUSCULAR; INTRAVENOUS EVERY 6 HOURS SCHEDULED
Status: COMPLETED | OUTPATIENT
Start: 2025-04-15 | End: 2025-04-17

## 2025-04-15 RX ORDER — METOCLOPRAMIDE HYDROCHLORIDE 5 MG/ML
5 INJECTION INTRAMUSCULAR; INTRAVENOUS EVERY 6 HOURS PRN
Status: ACTIVE | OUTPATIENT
Start: 2025-04-15 | End: 2025-04-16

## 2025-04-15 RX ADMIN — CEFAZOLIN SODIUM 2000 MG: 2 SOLUTION INTRAVENOUS at 16:28

## 2025-04-15 RX ADMIN — MORPHINE SULFATE 0.15 MG: 0.5 INJECTION, SOLUTION EPIDURAL; INTRATHECAL; INTRAVENOUS at 16:33

## 2025-04-15 RX ADMIN — BUPIVACAINE HYDROCHLORIDE IN DEXTROSE 1.6 ML: 7.5 INJECTION, SOLUTION SUBARACHNOID at 16:33

## 2025-04-15 RX ADMIN — ACETAMINOPHEN 975 MG: 325 TABLET, FILM COATED ORAL at 20:32

## 2025-04-15 RX ADMIN — ONDANSETRON 4 MG: 2 INJECTION INTRAMUSCULAR; INTRAVENOUS at 16:53

## 2025-04-15 RX ADMIN — SODIUM CHLORIDE, SODIUM LACTATE, POTASSIUM CHLORIDE, AND CALCIUM CHLORIDE 125 ML/HR: .6; .31; .03; .02 INJECTION, SOLUTION INTRAVENOUS at 12:12

## 2025-04-15 RX ADMIN — SODIUM CHLORIDE, SODIUM LACTATE, POTASSIUM CHLORIDE, CALCIUM CHLORIDE: 600; 310; 30; 20 INJECTION, SOLUTION INTRAVENOUS at 16:40

## 2025-04-15 RX ADMIN — KETOROLAC TROMETHAMINE 30 MG: 30 INJECTION, SOLUTION INTRAMUSCULAR; INTRAVENOUS at 20:31

## 2025-04-15 RX ADMIN — OXYTOCIN 250 MILLI-UNITS/MIN: 10 INJECTION INTRAVENOUS at 16:51

## 2025-04-15 RX ADMIN — Medication 1 TABLET: at 08:46

## 2025-04-15 RX ADMIN — FENTANYL CITRATE 15 MCG: 50 INJECTION INTRAMUSCULAR; INTRAVENOUS at 16:37

## 2025-04-15 RX ADMIN — KETOROLAC TROMETHAMINE 30 MG: 30 INJECTION, SOLUTION INTRAMUSCULAR; INTRAVENOUS at 17:07

## 2025-04-15 RX ADMIN — DEXAMETHASONE SODIUM PHOSPHATE 10 MG: 10 INJECTION INTRAMUSCULAR; INTRAVENOUS at 16:53

## 2025-04-15 RX ADMIN — METOCLOPRAMIDE 10 MG: 5 INJECTION, SOLUTION INTRAMUSCULAR; INTRAVENOUS at 15:32

## 2025-04-15 RX ADMIN — PHENYLEPHRINE HYDROCHLORIDE 50 MCG/MIN: 50 INJECTION INTRAVENOUS at 16:27

## 2025-04-15 RX ADMIN — SODIUM CHLORIDE, SODIUM LACTATE, POTASSIUM CHLORIDE, AND CALCIUM CHLORIDE: .6; .31; .03; .02 INJECTION, SOLUTION INTRAVENOUS at 16:23

## 2025-04-15 RX ADMIN — OXYTOCIN 62.5 MILLI-UNITS/MIN: 10 INJECTION INTRAVENOUS at 19:16

## 2025-04-15 RX ADMIN — DOCUSATE SODIUM 100 MG: 100 CAPSULE, LIQUID FILLED ORAL at 20:32

## 2025-04-15 NOTE — PROGRESS NOTES
OBGYN Progress Note - Antepartum  Isaura Ballesteros 34 y.o. female MRN: [id number]  Unit/Bed#:  327-01 Encounter: [id number]    Assessment & Plan  Non-reassuring fetal heart tones complicating pregnancy, antepartum  Isaura is a 33yo G1 at 35w1d admitted for obs in setting of NRFHT.   Presented to triage initially for r/o ROM which was negative. TAYLOR normal at 15.08cm  Initially presented with one late deceleration followed by a prolonged 2-3 minute deceleration. Continued to have intermittent prolonged decelerations overnight.     Plan:  Continuous fetal monitoring  Consider BTM per shared-decision making  Regular diet order  MFM consult, recommendations appreciated  35 weeks gestation of pregnancy  Prenatal panel completed  NIPS low risk  Continuous fetal monitoring  Growth on 4/4/25: Normal interval growth  AC             28.61 cm        32 weeks 4 days* (26%)  BPD             8.85 cm        35 weeks 6 days* (94%)  HC             30.04 cm        33 weeks 2 days* (11%)  Femur           6.11 cm        31 weeks 5 days* (5%)     HC/AC           1.05 [0.96 - 1.11]                 (56%)  FL/AC             21 [20 - 24]  FL/BPD            69 [71 - 87]  EFW Hadlock 4   2031 grams - 4 lbs 8 oz                 (19%)      Subjective:   Isaura reports feeling well this morning. She reports pink tinged discharge overnight after having a cervical exam yesterday. She denies contractions, loss of fluid, or decreased fetal movement. She is declining betamethasone at this time because she feels that that means she is going to be delivered. She also feels that in the episodes in which she had decelerations, she also wasn't feeling well so feels that the decelerations at those times make sense.    Objective:   Vitals:   Temp:  [97.4 °F (36.3 °C)-97.9 °F (36.6 °C)] 97.8 °F (36.6 °C)  HR:  [68-73] 68  BP: (102-113)/(57-72) 102/57  Resp:  [17-18] 18  SpO2:  [99 %-100 %] 99 %  O2 Device: None (Room air)     Physical Exam  Vitals  reviewed. Exam conducted with a chaperone present.   Constitutional:       General: She is not in acute distress.     Appearance: Normal appearance.   HENT:      Head: Normocephalic and atraumatic.      Nose: Nose normal.      Mouth/Throat:      Pharynx: Oropharynx is clear.   Eyes:      Conjunctiva/sclera: Conjunctivae normal.   Cardiovascular:      Rate and Rhythm: Normal rate and regular rhythm.      Heart sounds: Normal heart sounds.   Pulmonary:      Effort: Pulmonary effort is normal.      Breath sounds: Normal breath sounds.   Abdominal:      Palpations: Abdomen is soft.      Tenderness: There is no abdominal tenderness. There is no guarding.   Skin:     General: Skin is warm and dry.      Coloration: Skin is not jaundiced.   Neurological:      Mental Status: She is alert.   Psychiatric:         Mood and Affect: Mood normal.         Behavior: Behavior normal.         Fetal Assessment:  FHT: 135/moderate variability/15x15 accelerations/intermittent prolonged decelerations; not reactive  [place]: Occasional contractions     Lab, Imaging and other studies: Results Review Statement: No pertinent imaging studies reviewed.    Lab Results   Component Value Date    WBC 9.12 04/14/2025    HGB 11.4 (L) 04/14/2025    HCT 34.0 (L) 04/14/2025    MCV 94 04/14/2025     04/14/2025       Lab Results   Component Value Date    CALCIUM 8.9 01/27/2023    K 3.9 01/27/2023    CO2 24 01/27/2023     (H) 01/27/2023    BUN 12 01/27/2023    CREATININE 0.74 01/27/2023       Lab Results   Component Value Date    ALT 28 01/27/2023    AST 14 01/27/2023    ALKPHOS 54 01/27/2023     Miriam Gleason MD   PGY-2, OBGYN  4/15/2025  6:20 AM

## 2025-04-15 NOTE — DISCHARGE SUMMARY
Discharge Summary - OB/GYN   Name: Isaura Ballesteros 34 y.o. female I MRN: [id number]  Unit/Bed#: LD PACU-02 I Date of Admission: 2025   Date of Service: 4/15/2025 I Hospital Day: 0        Admission Date: 2025     Discharge Date: 25     Admitting Diagnoses:   Pregnancy at 35w1d  Non-reassuring fetal heart rate with late deceleration    Discharge Diagnoses:   Same, delivered    Procedures:   primary  section, low transverse incision      Admitting Attending: Dr. Bonner  Delivery Attending: Dr. Harrell  Discharge Attending: Dr. Bruce    [facility] Course:   Isaura Ballesteros is a 33 yo  who was admitted at 35w0d due to nonreassuring fetal heart tracing.  She initially presented to [facility] triage with a complaint of leakage of fluid and rupture workup was noted to be negative.  However during NST two  late decelerations were noted and observation was recommended.  During her overnight observation and admission day 1 several prolonged spontaneous decelerations were noted.  Due to concern for potential fetal deterioration and urgent  section delivery was recommended. Patient was offered betamethasone for fetal lung maturation and was counseled extensively about the potential benefits but was undecided up to the point when a  was recommended.  She was counseled on the risks of a  section delivery including bleeding, infection, injury to local structures.  Patient expressed understanding and wished to proceed with the recommended procedure.      She underwent an uncomplicated primary low transverse  section delivery on 04/15/2025 and delivered a viable female  at 1649. APGARS were 8, 9 at 1 and 5 minutes, respectively. 's birth weight was 4lb 11.7oz. Placenta delivered without difficulty.   was admitted to the  nursery. Patient tolerated the procedure well and was transferred to recovery in stable condition.     The  patient's post partum course was unremarkable.. Her preoperative hemoglobin was 11.4g/dL, postoperative was 11.5. Her postoperative pain was well controlled with oral analgesics.    On day of discharge, she was ambulating and able to reasonably perform all ADLs. She was voiding and had appropriate bowel function. Pain was well controlled. She was discharged home on post-operative day #3 without complications. Patient was instructed to follow up with her OB as an outpatient and was given appropriate warnings to call provider if she develops signs of infection or uncontrolled pain. She is breast feeding .  Mom's blood type is O positive,RhoGAM was not indicated.      Complications:   None    Condition at discharge:   good     Provisions for Follow-Up Care:  See after visit summary for information related to follow-up care and any pertinent home health orders.      Disposition:   Home    Planned Readmission:   No    Discharge Medications:   Please see AVS for a complete list of discharge medications.    Discharge instructions :   Please see AVS for complete discharge instructions.    Sumi Yeung  PGY-1 OB/GYN  04/18/25  6:00 PM

## 2025-04-15 NOTE — PLAN OF CARE
Problem: ANTEPARTUM  Goal: Maintain pregnancy as long as maternal and/or fetal condition is stable  Description: INTERVENTIONS:- Maternal surveillance- Fetal surveillance- Monitor uterine activity- Medications as ordered- Bedrest  Outcome: Progressing     Problem: PAIN - ADULT  Goal: Verbalizes/displays adequate comfort level or baseline comfort level  Description: Interventions:- Encourage patient to monitor pain and request assistance- Assess pain using appropriate pain scale- Administer analgesics based on type and severity of pain and evaluate response- Implement non-pharmacological measures as appropriate and evaluate response- Consider cultural and social influences on pain and pain management- Notify physician/advanced practitioner if interventions unsuccessful or patient reports new pain  Outcome: Progressing     Problem: INFECTION - ADULT  Goal: Absence or prevention of progression during hospitalization  Description: INTERVENTIONS:- Assess and monitor for signs and symptoms of infection- Monitor lab/diagnostic results- Monitor all insertion sites, i.e. indwelling lines, tubes, and drains- Monitor endotracheal if appropriate and nasal secretions for changes in amount and color- [place] appropriate cooling/warming therapies per order- Administer medications as ordered- Instruct and encourage patient and family to use good hand hygiene technique- Identify and instruct in appropriate isolation precautions for identified infection/condition  Outcome: Progressing  Goal: Absence of fever/infection during neutropenic period  Description: INTERVENTIONS:- Monitor WBC  Outcome: Progressing     Problem: SAFETY ADULT  Goal: Patient will remain free of falls  Description: INTERVENTIONS:- Educate patient/family on patient safety including physical limitations- Instruct patient to call for assistance with activity - Keep Call bell within reach- Keep bed low and locked with side rails adjusted as appropriate- Keep care  items and personal belongings in reach.  Outcome: Progressing  Goal: Maintain or return to baseline ADL function  Description: INTERVENTIONS:-  Assess patient's ability to carry out ADLs; assess patient's baseline for ADL function and identify physical deficits which impact ability to perform ADLs (bathing, care of mouth/teeth, toileting, grooming, dressing, etc.)- Assess/evaluate cause of self-care deficits - Assess range of motion- Assess patient's mobility; develop plan if impaired- Assess patient's need for assistive devices and provide as appropriate- Encourage maximum independence but intervene and supervise when necessary- Involve family in performance of ADLs- Assess for home care needs following discharge - Consider OT consult to assist with ADL evaluation and planning for discharge- Provide patient education as appropriate  Outcome: Progressing  Goal: Maintains/Returns to pre admission functional level  Description: INTERVENTIONS:- Perform AM-PAC 6 Click Basic Mobility/ Daily Activity assessment daily.- Set and communicate daily mobility goal to care team and patient/family/caregiver.   Outcome: Progressing     Problem: Knowledge Deficit  Goal: Patient/family/caregiver demonstrates understanding of disease process, treatment plan, medications, and discharge instructions  Description: Complete learning assessment and assess knowledge base.Interventions:- Provide teaching at level of understanding- Provide teaching via preferred learning methods  Outcome: Progressing     Problem: DISCHARGE PLANNING  Goal: Discharge to home or other facility with appropriate resources  Description: INTERVENTIONS:- Identify barriers to discharge w/patient and caregiver- Arrange for needed discharge resources and transportation as appropriate- Identify discharge learning needs (meds, wound care, etc.)- Arrange for interpretive services to assist at discharge as needed- Refer to Case Management Department for coordinating  discharge planning if the patient needs post-hospital services based on physician/advanced practitioner order or complex needs related to functional status, cognitive ability, or social support system  Outcome: Progressing

## 2025-04-15 NOTE — PROGRESS NOTES
"Progress Note - OB/GYN   Name: Isaura Ballesteros 34 y.o. female I MRN: [id number]  Unit/Bed#: -01 I Date of Admission: 2025   Date of Service: 4/15/2025 I Hospital Day: 0     Assessment & Plan  35 weeks gestation of pregnancy   Growth Scan from  at 33w4d - EFW  2031 grams - 4 lbs 8 oz    (19%)  Continue Prenatal vitamins    Plan:   Continuous fetal monitoring      Non-reassuring fetal heart tones complicating pregnancy, antepartum  Presented to triage initially for leakage of fluid and PPROM workup was noted to be negative  but then had two prolonged  late decelerations on NST and was subsequently admitted for observation  -Decels at 1103 and 1245   -SVE 0.5/0/-4, infrequent irregular contractions, low suspicion for  labor at this time    Plan:   Multiple prolonged decelerations are concerning for possible placental insufficiency. Therefore continued observation for observation overnight with continuous fetal monitoring is recommended  Fetal  lung maturity : Plan to offer Betamethasone  as administration to pts at risk for   delivery significantly reduces the rate of  respiratory complications and it is strongly recommended prior to 34 wks. ACOG and SMFM also recommend offering BTM in the late  GA 34-36. > pt declines at this time 04/15 AM  Recommend repeat SVE if pt reports more contractions  Repeat BPP and umbilical cord doppler 4/15 AM               Subjective    Contractions: no  Loss of fluid: no  Vaginal bleeding: no  Fetal movement: no    Pain: no  Headaches: no  Visual changes: no  Chest pain: no  Shortness of breath: no  Nausea: no  Vomiting/Diarrhea: no  Dysuria: no  Leg pain: no          Objective      Patient Vitals for the past 24 hrs:   BP Temp Temp src Pulse Resp SpO2 Height Weight   04/15/25 0003 102/57 97.8 °F (36.6 °C) Oral 68 18 99 % -- --   25 -- -- -- -- -- -- 5' 7\" (1.702 m) 78.5 kg (173 lb)   25 1640 113/72 97.9 °F (36.6 °C) Oral " "73 17 100 % -- --   25 1045 -- (!) 97.4 °F (36.3 °C) Oral -- 18 -- -- --       Physical Exam  Constitutional:       Appearance: Normal appearance.   HENT:      Head: Normocephalic and atraumatic.   Eyes:      Extraocular Movements: Extraocular movements intact.   Cardiovascular:      Pulses: Normal pulses.   Pulmonary:      Effort: Pulmonary effort is normal.   Musculoskeletal:         General: Normal range of motion.      Cervical back: Normal range of motion.   Neurological:      General: No focal deficit present.      Mental Status: She is alert.   Psychiatric:         Mood and Affect: Mood normal.         Behavior: Behavior normal.         I/O       None            Invasive Devices       Peripheral Intravenous Line  Duration             Peripheral IV 25 Left;Dorsal (posterior) Wrist <1 day                    Results from last 7 days   Lab Units 25  1658   WBC Thousand/uL 9.12   TOTAL NEUT ABS Thousands/µL 7.04   HEMOGLOBIN g/dL 11.4*   MCV fL 94   PLATELETS Thousands/uL 188           Invalid input(s): \"GLU\", \"CA\"      Results from last 7 days   Lab Units 25  1658   PLATELETS Thousands/uL 188                           Brief review of pertinent history:  Past Medical History:   Diagnosis Date    Acne vulgaris 2021    Asthma     childhood - no inhaler use    Diarrhea 2021    Encounter for long-term current use of medication 2021    Irregular menses 2021    Pigmented nevus 2021    Abdomen 15x7 mm Lower right back 10x8 mm Upper left back=2 nevi    Varicella     pt had chickenpox in childhood     Past Surgical History:   Procedure Laterality Date    APPENDECTOMY      MOLE REMOVAL      2 - both benign     OB History    Para Term  AB Living   1 0 0 0 0 0   SAB IAB Ectopic Multiple Live Births   0 0 0 0 0      # Outcome Date GA Lbr Judah/2nd Weight Sex Type Anes PTL Lv   1 Current                Fetal data:  Nonstress test: date 04/15/25 Time: 0550 - " 0630  Baseline: 130bpm  Variability: moderate  Accelerations: present, 15x15  Decelerations: one prolonged late deceleration  Contractions: absent  Assessment: reactive  Plan: continous      MFM ultrasound report key findings: level II scan from 04/04/2025 date at 33w4d gestational age.  AC             28.61 cm        32 weeks 4 days* (26%)  BPD             8.85 cm        35 weeks 6 days* (94%)  HC             30.04 cm        33 weeks 2 days* (11%)  Femur           6.11 cm        31 weeks 5 days* (5%)     HC/AC           1.05 [0.96 - 1.11]                 (56%)  FL/AC             21 [20 - 24]  FL/BPD            69 [71 - 87]  EFW Hadlock 4   2031 grams - 4 lbs 8 oz                 (19%)     THE AVERAGE GESTATIONAL AGE is 33 weeks 3 days +/- 21 days.     AMNIOTIC FLUID     Q1: 4.6      Q2: 5.3      Q3: 6.9      Q4: 5.0  TAYLOR Total = 21.8 cm  Amniotic Fluid: Normal    MEDS:   Medication Administration - last 24 hours from 04/14/2025 0750 to 04/15/2025 0750         Date/Time Order Dose Route Action Action by     04/14/2025 1756 EDT prenatal multivitamin tablet 1 tablet 1 tablet Oral Given Piotr Grimes, NORMA     04/14/2025 2013 EDT lactated ringers infusion 125 mL/hr Intravenous Marco Hutson RN            Current Facility-Administered Medications:     lactated ringers infusion, Continuous, Last Rate: 125 mL/hr (04/14/25 2013)    prenatal multivitamin tablet 1 tablet, Daily         Yasmin Lerner MD  OBGYN, PGY-3  4/15/2025  7:50 AM

## 2025-04-15 NOTE — ANESTHESIA POSTPROCEDURE EVALUATION
Post-Op Assessment Note    CV Status:  Stable  Pain Score: 2    Pain management: adequate       Mental Status:  Alert and awake   Hydration Status:  Euvolemic   PONV Controlled:  Controlled   Airway Patency:  Patent     Post Op Vitals Reviewed: Yes    No anethesia notable event occurred.    Staff: Anesthesiologist           Last Filed PACU Vitals:  Vitals Value Taken Time   Temp 97.6 °F (36.4 °C) 04/15/25 1729   Pulse 66 04/15/25 1745   /67 04/15/25 1730   Resp     SpO2 93 % 04/15/25 1745   Vitals shown include unfiled device data.

## 2025-04-15 NOTE — CONSULTS
NICU Prenatal Consult   Isaura Ballesteros 34 y.o. female MRN: [id number]  Unit/Bed#: -01 Encounter: [id number]    Consulting Physician: Yadira Cloud MD      A NICU Prenatal Consult has been requested by Yadira Cloud MD due to prematurity.     Isaura Ballesteros is a 34 y.o. , with an HAMMAD of 2025  at 35w1d GA     Isaura is expecting a girl, to be named Tawana. Estimated fetal weight is 1 gms  (as of 2025).     Along with Tyler Delarosa, her  was present for the consultation.     Prenatal Care:Yes.     Prenatal Labs:  Lab Results   Component Value Date/Time    ABO Grouping O 2025 04:58 PM    Rh Factor Positive 2025 04:58 PM    Hepatitis B Surface Ag Non-reactive 2024 03:41 PM    Hepatitis C Ab Non-reactive 2024 03:41 PM    Rubella IgG Quant 62.1 2024 03:41 PM     Unknown labs at this time: GBS    Pregnancy complications:   Patient Active Problem List   Diagnosis    Pigmented nevus    Acne vulgaris    Atypical nevus of abdominal wall    35 weeks gestation of pregnancy    Prenatal care in third trimester    Non-reassuring fetal heart tones complicating pregnancy, antepartum     Maternal medical history:   Past Medical History:   Diagnosis Date    Acne vulgaris 2021    Asthma     childhood - no inhaler use    Diarrhea 2021    Encounter for long-term current use of medication 2021    Irregular menses 2021    Pigmented nevus 2021    Abdomen 15x7 mm Lower right back 10x8 mm Upper left back=2 nevi    Varicella     pt had chickenpox in childhood     Medications at home:   Medications Prior to Admission:     clindamycin (CLEOCIN T) 1 % lotion    melatonin 1 MG CHEW    Prenatal Vit-Fe Fumarate-FA (PRENATAL PO)  Maternal social history:  Social History     Tobacco Use    Smoking status: Never    Smokeless tobacco: Never   Substance Use Topics    Alcohol use: Not Currently     Alcohol/week: 12.0 standard drinks of alcohol      Types: 8 Glasses of wine, 4 Standard drinks or equivalent per week     Maternal  medications: None    I spoke with Isaura Ballesteros today regarding the upcoming birth of her daughter. We discussed the importance of Betamethasone for lung maturity for  infants. Mom and dad are going to wait till evening to decide about it.     All of Isaura's questions were answered to the best of my ability, and she was encouraged to contact us with any further questions.      Thank you for including us in the care of Isaura Ballesteros. Please reach out to the on-call Neonatologist via [employer] for any further questions that may arise.    I spent 20 minutes in consultation with Isaura Ballesteros, of which 60% was in direct communication.    PATRICIA Velazquez  - Medicine

## 2025-04-15 NOTE — PROGRESS NOTES
Spoke with Isaura and her family at the bedside regarding the ongoing fetal heart rate deceleration concerns along with resident physician Dr Lerner and her primary OBGYN Dr Duran.      Since the time of our rounding note she has had 3 decelerations at 9 AM 11:41 AM and again around 2 PM.  She has had mostly spontaneous prolonged decelerations which appear to be longer in nature as well as a late deceleration after her last prolonged decel at 1149.    We discussed that now she has been under continuous fetal monitoring for well over 24 hours and these fetal decelerations continue to recur. The etiology is unclear but indicate concern for ongoing fetal distress.  We discussed the concern for awaiting fetal deterioration prior to proceeding to delivery versus proceeding under controlled circumstances.  She previously declined late  steroids and given gestational age I would not for hold delivery for their administration given the ongoing concern for fetal wellbeing.      We discussed vaginal delivery versus  and the heightened risk of requiring  in the process of an induction given nonreassuring fetal heart tracing.  After discussion she is amenable to a primary  delivery.  She will be made NPO in anticipation of the OR. All questions were answered to their apparent satisfaction. Please do not hesitate to reach out to M with any further concerns.    Aysha Daley MD  Attending Physician, Maternal-Fetal Medicine  [facility]

## 2025-04-15 NOTE — PLAN OF CARE
Problem: ANTEPARTUM  Goal: Maintain pregnancy as long as maternal and/or fetal condition is stable  Description: INTERVENTIONS:- Maternal surveillance- Fetal surveillance- Monitor uterine activity- Medications as ordered- Bedrest  Outcome: Progressing     Problem: PAIN - ADULT  Goal: Verbalizes/displays adequate comfort level or baseline comfort level  Description: Interventions:- Encourage patient to monitor pain and request assistance- Assess pain using appropriate pain scale- Administer analgesics based on type and severity of pain and evaluate response- Implement non-pharmacological measures as appropriate and evaluate response- Consider cultural and social influences on pain and pain management- Notify physician/advanced practitioner if interventions unsuccessful or patient reports new pain  Outcome: Progressing     Problem: INFECTION - ADULT  Goal: Absence or prevention of progression during hospitalization  Description: INTERVENTIONS:- Assess and monitor for signs and symptoms of infection- Monitor lab/diagnostic results- Monitor all insertion sites, i.e. indwelling lines, tubes, and drains- Monitor endotracheal if appropriate and nasal secretions for changes in amount and color- [place] appropriate cooling/warming therapies per order- Administer medications as ordered- Instruct and encourage patient and family to use good hand hygiene technique- Identify and instruct in appropriate isolation precautions for identified infection/condition  Outcome: Progressing  Goal: Absence of fever/infection during neutropenic period  Description: INTERVENTIONS:- Monitor WBC  Outcome: Progressing     Problem: SAFETY ADULT  Goal: Patient will remain free of falls  Description: INTERVENTIONS:- Educate patient/family on patient safety including physical limitations- Instruct patient to call for assistance with activity - Keep Call bell within reach- Keep bed low and locked with side rails adjusted as appropriate- Keep care  items and personal belongings in reach.  Outcome: Progressing  Goal: Maintain or return to baseline ADL function  Description: INTERVENTIONS:-  Assess patient's ability to carry out ADLs; assess patient's baseline for ADL function and identify physical deficits which impact ability to perform ADLs (bathing, care of mouth/teeth, toileting, grooming, dressing, etc.)- Assess/evaluate cause of self-care deficits - Assess range of motion- Assess patient's mobility; develop plan if impaired- Assess patient's need for assistive devices and provide as appropriate- Encourage maximum independence but intervene and supervise when necessary- Involve family in performance of ADLs- Assess for home care needs following discharge - Consider OT consult to assist with ADL evaluation and planning for discharge- Provide patient education as appropriate  Outcome: Progressing  Goal: Maintains/Returns to pre admission functional level  Description: INTERVENTIONS:- Perform AM-PAC 6 Click Basic Mobility/ Daily Activity assessment daily.- Set and communicate daily mobility goal to care team and patient/family/caregiver.   Outcome: Progressing     Problem: Knowledge Deficit  Goal: Patient/family/caregiver demonstrates understanding of disease process, treatment plan, medications, and discharge instructions  Description: Complete learning assessment and assess knowledge base.Interventions:- Provide teaching at level of understanding- Provide teaching via preferred learning methods  Outcome: Progressing     Problem: DISCHARGE PLANNING  Goal: Discharge to home or other facility with appropriate resources  Description: INTERVENTIONS:- Identify barriers to discharge w/patient and caregiver- Arrange for needed discharge resources and transportation as appropriate- Identify discharge learning needs (meds, wound care, etc.)- Arrange for interpretive services to assist at discharge as needed- Refer to Case Management Department for coordinating  discharge planning if the patient needs post-hospital services based on physician/advanced practitioner order or complex needs related to functional status, cognitive ability, or social support system  Outcome: Progressing

## 2025-04-15 NOTE — ANESTHESIA PREPROCEDURE EVALUATION
"Procedure:  PRE-OP ONLY    35 weeks with non reassuring fetal heart rate   Pt ate at 12 30 pm  discussed with OB doc and they dont want to wait for 8 hrs fasting. Want to proceed around 4 pm   Relevant Problems   ANESTHESIA (within normal limits)      CARDIO (within normal limits)      ENDO (within normal limits)      GI/HEPATIC (within normal limits)      GYN   (+) 35 weeks gestation of pregnancy      HEMATOLOGY (within normal limits)      MUSCULOSKELETAL (within normal limits)      NEURO/PSYCH (within normal limits)        Physical Exam    Airway    Mallampati score: II  TM Distance: >3 FB  Neck ROM: full     Dental   No notable dental hx     Cardiovascular  Cardiovascular exam normal    Pulmonary  Pulmonary exam normal     Other Findings  post-pubertal.      Anesthesia Plan  ASA Score- 2     Anesthesia Type- spinal with ASA Monitors.         Additional Monitors:     Airway Plan:     Comment: Possibility of GA with ETT explained to pt .       Plan Factors-Exercise tolerance (METS): >4 METS.    Chart reviewed.   Existing labs reviewed. Patient summary reviewed.    Patient is not a current smoker. Patient not instructed to abstain from smoking on day of procedure. Patient did not smoke on day of surgery.            Induction-     Postoperative Plan- Plan for postoperative opioid use.     Perioperative Resuscitation Plan - Level 1 - Full Code.       Informed Consent- Anesthetic plan and risks discussed with patient.  I personally reviewed this patient with the CRNA. Discussed and agreed on the Anesthesia Plan with the CRNA..      NPO Status:  No vitals data found for the desired time range.        No results found for: \"HGBA1C\"    Lab Results   Component Value Date    K 3.9 01/27/2023     (H) 01/27/2023    CO2 24 01/27/2023    BUN 12 01/27/2023    CREATININE 0.74 01/27/2023    GLUF 98 01/27/2023    CALCIUM 8.9 01/27/2023    AST 14 01/27/2023    ALT 28 01/27/2023    ALKPHOS 54 01/27/2023    EGFR 108 01/27/2023 "       Lab Results   Component Value Date    WBC 9.12 04/14/2025    HGB 11.4 (L) 04/14/2025    HCT 34.0 (L) 04/14/2025    MCV 94 04/14/2025     04/14/2025

## 2025-04-15 NOTE — ASSESSMENT & PLAN NOTE
Prenatal panel completed  NIPS low risk  Continuous fetal monitoring  Growth on 4/4/25: Normal interval growth  AC             28.61 cm        32 weeks 4 days* (26%)  BPD             8.85 cm        35 weeks 6 days* (94%)  HC             30.04 cm        33 weeks 2 days* (11%)  Femur           6.11 cm        31 weeks 5 days* (5%)     HC/AC           1.05 [0.96 - 1.11]                 (56%)  FL/AC             21 [20 - 24]  FL/BPD            69 [71 - 87]  EFW [place] 4   2031 grams - 4 lbs 8 oz                 (19%)

## 2025-04-15 NOTE — UTILIZATION REVIEW
Initial Clinical Review  OBS 25 @ 1201 CONVERTED TO INPATIENT ADMISSION FOR CONTINUATION OF CARE AND THE NEED FOR DELIVERY.   INPATIENT ADMISSION  Once        Transfer Service: OB/GYN   Question Answer Comment   Level of Care Med Surg    Estimated length of stay More than 2 Midnights    Certification I certify that inpatient services are medically necessary for this patient for a duration of greater than two midnights. See H&P and MD Progress Notes for additional information about the patient's course of treatment.        04/15/25 1535       Admission: Date/Time/Statement:   Admission Orders (From admission, onward)       Ordered        25 1201  Place in Observation  Once                          Orders Placed This Encounter   Procedures    Place in Observation     Standing Status:   Standing     Number of Occurrences:   1     Level of Care:   Med Surg [16]         Chief Complaint   Patient presents with    Rupture of Membranes     Increase of clear vaginal leaking since Saturday        Initial Presentation: 34 y.o. female presented to L&D as observation for fetal surveillance for non-reassuring fetal heart tones.   35w0d Presented to triage for leaking of fluid. Work up negative for ROM, but was noted to have two prolonged decels during monitoring. First prolonged deceleration was in relation to a contraction, second was related to maternal movement. BPP performed was 10/10. Recent growth US within normal limits. Recommend obs for fetal monitoring. Plan Continuous external toco monitoring and fetal heart monitoring  and supportive care   FHT:  Baseline Rate (FHR): 140 bpm  Variability: Moderate  Accelerations: 15 x 15 or greater  Decelerations: (!) Present, Prolonged >2 minutes, Daryl not <80 bpm  FHR Category: Category II   SVE 0.5/0/-4, infrequent irregular contractions, low suspicion for  labor at this time   Consult MFM:   Recommendations are as follows:  Agree with prolonged  monitoring with plan for continuous EFM overnight, to monitor for recurrence of spontaneous decelerations that occurred in triage that could potentially warrant iatrogenic  delivery. We discussed considering ALPS BMZ in case she has further decelerations overnight. Unclear etiology given recently normal growth scan and lack of further OB complaints (ruled out for rupture, not experiencing contractions). Will monitor closely. Briefly discussed possibility of placental disease that can result in fetal heart rate change      Date: 04-15-25   Day 2: Continued to have intermittent prolonged decelerations overnight. . She reports pink tinged discharge overnight after having a cervical exam yesterday. She denies contractions, loss of fluid, or decreased fetal movement. She is declining betamethasone at this time because she feels that that means she is going to be delivered. She also feels that in the episodes in which she had decelerations, she also wasn't feeling well so feels that the decelerations at those times make sense.  Plan Continuous external toco monitoring and fetal heart monitoring     Fetal data:  Nonstress test: date 04/15/25 Time: 0550 - 0630  Baseline: 130bpm  Variability: moderate  Accelerations: present, 15x15  Decelerations: one prolonged late deceleration  Contractions: absent  Assessment: reactive  Plan: continous  Plan: As per MFM  Multiple prolonged decelerations are concerning for possible placental insufficiency. Therefore continued observation for observation overnight with continuous fetal monitoring is recommended  Fetal  lung maturity : Plan to offer Betamethasone  as administration to pts at risk for   delivery significantly reduces the rate of  respiratory complications and it is strongly recommended prior to 34 wks. ACOG and SMFM also recommend offering BTM in the late  GA 34-36. > pt declines at this time 04/15 AM  Recommend repeat SVE if pt reports more  contractions  Repeat BPP and umbilical cord doppler 4/15 AM                        Scheduled Medications:  prenatal multivitamin, 1 tablet, Oral, Daily      Continuous IV Infusions:  lactated ringers, 125 mL/hr, Intravenous, Continuous      PRN Meds:     Admitting  Vitals   Temperature Pulse Respirations Blood Pressure SpO2 Pain Score   04/14/25 1045 04/14/25 1640 04/14/25 1045 04/14/25 1640 04/14/25 1640 04/14/25 1640   (!) 97.4 °F (36.3 °C) 73 18 113/72 100 % No Pain     Weight (last 2 days)       Date/Time Weight    04/15/25 0743 --    Comment rows:    OBSERV: per the patient, the baby is active and denies s/s of labor. at 04/15/25 0743    04/14/25 2221 78.5 (173)            Vital Signs (last 3 days)       Date/Time Temp Pulse Resp BP MAP (mmHg) SpO2 O2 Device Cardiac (WDL) Patient Position - Orthostatic VS Pain    04/15/25 0743 -- -- -- -- -- -- -- -- -- No Pain    OBSERV: per the patient, the baby is active and denies s/s of labor. at 04/15/25 0743    04/15/25 0600 -- -- -- -- -- -- -- WDL -- --    04/15/25 0500 -- -- -- -- -- -- -- WDL -- --    04/15/25 0400 -- -- -- -- -- -- -- WDL -- --    04/15/25 0003 97.8 °F (36.6 °C) 68 18 102/57 74 99 % None (Room air) -- Lying No Pain    04/14/25 1640 97.9 °F (36.6 °C) 73 17 113/72 87 100 % None (Room air) -- Sitting No Pain    04/14/25 1045 97.4 °F (36.3 °C) -- 18 -- -- -- -- -- -- --              Pertinent Labs/Diagnostic Test Results:         Results from last 7 days   Lab Units 04/14/25  1658   WBC Thousand/uL 9.12   HEMOGLOBIN g/dL 11.4*   HEMATOCRIT % 34.0*   PLATELETS Thousands/uL 188   TOTAL NEUT ABS Thousands/µL 7.04       Past Medical History:   Diagnosis Date    Acne vulgaris 04/05/2021    Asthma     childhood - no inhaler use    Diarrhea 04/05/2021    Encounter for long-term current use of medication 04/05/2021    Irregular menses 04/05/2021    Pigmented nevus 04/05/2021    Abdomen 15x7 mm Lower right back 10x8 mm Upper left back=2 nevi    Varicella     pt  had chickenpox in childhood     Present on Admission:  **None**      Admitting Diagnosis: Evaluation by medical service required [Z00.00]  Age/Sex: 34 y.o. female    Network Utilization Review Department  ATTENTION: Please call with any questions or concerns to [phone] and carefully listen to the prompts so that you are directed to the right person. All voicemails are confidential.   For Discharge needs, contact Care Management DC Support Team at [phone] opt. 2  Send all requests for admission clinical reviews, approved or denied determinations and any other requests to dedicated fax number below belonging to the campus where the patient is receiving treatment. List of dedicated fax numbers for the Facilities:  FACILITY NAME UR FAX NUMBER   ADMISSION DENIALS (Administrative/Medical Necessity) [phone]   DISCHARGE SUPPORT TEAM (NETWORK) [phone]   PARENT CHILD HEALTH (Maternity/NICU/Pediatrics) [phone]   [facility] [phone]   [facility] [phone]   [facility] [phone]   [facility] [phone]   [facility] [phone]   [facility] [phone]   [facility] [phone]   [facility] [phone]   [facility] [phone]   Pending sale to [facility] [phone]   [facility] [phone]   [facility] [phone]

## 2025-04-15 NOTE — ANESTHESIA PROCEDURE NOTES
Spinal Block    Patient location during procedure: OR  Start time: 4/15/2025 4:33 PM  Reason for block: procedure for pain and at surgeon's request  Staffing  Performed by: Jadyn Brownlee CRNA  Authorized by: Elise Allred MD    Preanesthetic Checklist  Completed: patient identified, IV checked, site marked, risks and benefits discussed, surgical consent, monitors and equipment checked, pre-op evaluation and timeout performed  Spinal Block  Patient position: sitting  Prep: ChloraPrep and site prepped and draped  Patient monitoring: frequent blood pressure checks, continuous pulse ox and heart rate  Approach: midline  Location: L3-4  Needle  Needle type: Pencan   Needle gauge: 24 G  Needle length: 5 in  Assessment  Sensory level: T4  Injection Assessment:  negative aspiration for heme, no paresthesia on injection and positive aspiration for clear CSF.  Post-procedure:  site cleaned  Additional Notes  Easy spinal  no immediate complications

## 2025-04-15 NOTE — ANESTHESIA PREPROCEDURE EVALUATION
"Procedure:   SECTION () (Uterus)    35 weeks with non reassuring fetal heart rate   Pt ate at 12 30 pm  discussed with OB doc and they dont want to wait for 8 hrs fasting. Want to proceed around 4 pm   Relevant Problems   ANESTHESIA (within normal limits)      CARDIO (within normal limits)      ENDO (within normal limits)      GI/HEPATIC (within normal limits)      GYN   (+) 35 weeks gestation of pregnancy      HEMATOLOGY (within normal limits)      MUSCULOSKELETAL (within normal limits)      NEURO/PSYCH (within normal limits)        Physical Exam    Airway    Mallampati score: II  TM Distance: >3 FB  Neck ROM: full     Dental   No notable dental hx     Cardiovascular  Cardiovascular exam normal    Pulmonary  Pulmonary exam normal     Other Findings  post-pubertal.      Anesthesia Plan  ASA Score- 2     Anesthesia Type- spinal with ASA Monitors.         Additional Monitors:     Airway Plan:     Comment: Possibility of GA with ETT explained to pt .       Plan Factors-Exercise tolerance (METS): >4 METS.    Chart reviewed.   Existing labs reviewed. Patient summary reviewed.    Patient is not a current smoker. Patient not instructed to abstain from smoking on day of procedure. Patient did not smoke on day of surgery.            Induction-     Postoperative Plan- Plan for postoperative opioid use.     Perioperative Resuscitation Plan - Level 1 - Full Code.       Informed Consent- Anesthetic plan and risks discussed with patient.  I personally reviewed this patient with the CRNA. Discussed and agreed on the Anesthesia Plan with the CRNA..      NPO Status:  No vitals data found for the desired time range.        No results found for: \"HGBA1C\"    Lab Results   Component Value Date    K 3.9 2023     (H) 2023    CO2 24 2023    BUN 12 2023    CREATININE 0.74 2023    GLUF 98 2023    CALCIUM 8.9 2023    AST 14 2023    ALT 28 2023    ALKPHOS 54 " 01/27/2023    EGFR 108 01/27/2023       Lab Results   Component Value Date    WBC 9.12 04/14/2025    HGB 11.4 (L) 04/14/2025    HCT 34.0 (L) 04/14/2025    MCV 94 04/14/2025     04/14/2025

## 2025-04-15 NOTE — OP NOTE
OPERATIVE REPORT  PATIENT NAME: Isaura Ballesteros    :  1990  MRN: [id number]  Pt Location: [facility] L&D OR ROOM 02    SURGERY DATE: 4/15/2025    Surgeons and Role:     * Ariana Torrez MD - Primary     * Yasmin Lerner MD - Assisting    Preop Diagnosis:  Pregnancy at 35w1d  Non-reassuring fetal heart rate with late deceleration O36.8390    Post-Op Diagnosis Codes:     * Non-reassuring fetal heart rate with late deceleration [O36.8390]    Procedure(s) (LRB):   SECTION () (N/A)    Specimen(s):  ID Type Source Tests Collected by Time Destination   1 :  Tissue (Placenta on Hold) OB Only Placenta TISSUE EXAM OB (PLACENTA) ONLY Ariana Torrez MD 4/15/2025 165    D :  Cord Blood Cord BLOOD GAS, VENOUS, CORD, BLOOD GAS, ARTERIAL, CORD Ariana Torrez MD 4/15/2025 1652        Surgical QBL:  Surgical QBL (mL): 318 mL      Drains:  Indwelling catheter    Anesthesia Type:   Spinal    Operative Indications:  Non-reassuring fetal heart rate with late deceleration O36.8390      Brief Hospital course:  Isaura Ballesteros is a 33 yo  who was admitted at 35w0d due to nonreassuring fetal heart tracing.  She initially presented to [facility] triage with a complaint of leakage of fluid and rupture workup was noted to be negative.  However during NST two  late decelerations were noted and observation was recommended.  During her overnight observation and admission day 1 several prolonged spontaneous decelerations were noted.  Due to concern for potential fetal deterioration delivery via  section delivery was recommended. Patient was offered betamethasone for fetal lung maturation and was counseled extensively about the potential benefits but was undecided up to the point when a  was recommended.  She was counseled on the risks of a  section delivery including bleeding, infection, injury to local structures.  Patient  expressed understanding and wished to proceed with the recommended procedure.     Martha Group Classification System:  No Multiple pregnancy, No Transverse or oblique lie, No Breech lie, Gestational age is < 37 weeks +  is MARTHA GROUP 10    Complications:   None      Operative Findings:  1. Viable female  at 1649 with APGARs of 8 and 9 at 1 and 5 minutes. Fetus weighted 4lb 11.7oz.  2. Normal intact placenta with centrally inserted 3VC.  3.  Arcuate uterus, bilateral tubes and ovaries.    Umbilical Cord Venous Blood Gas:  Results from last 7 days   Lab Units 04/15/25  1652   PH COV  7.346   PCO2 COV mm HG 43.2*   HCO3 COV mmol/L 23.1   BASE EXC COV mmol/L -2.6*   O2 CT CD VB mL/dL 6.5   O2 HGB, VENOUS CORD % 31.8     Umbilical Cord Arterial Blood Gas:  Results from last 7 days   Lab Units 04/15/25  1652   PH COA  7.304   PCO2 COA  50.1   PO2 COA mm HG 15.3   HCO3 COA mmol/L 24.3   BASE EXC COA mmol/L -2.7*   O2 CONTENT CORD ART ml/dl 7.4   O2 HGB, ARTERIAL CORD % 32.2        Procedure:   The patient was taken to the operating room.  Spinal anesthesia was adequately established and 2 g Ancef was given for preoperative prophylaxis. The patient was then placed in a supine position with a left lateral tilt. Garsia catheter was placed in sterile fashion. Fetal heart tones were noted to be normal. The patient was then prepped with chloraprep for abdominal prep and betadine for vaginal prep and draped in the usual sterile fashion for a Pfannenstiel skin incision.      A time out was performed to confirm correct patient and correct procedure.     An incision was made in the skin with a surgical scalpel and sharp dissection was carried out over subsequent layers of tissue including the fascia, followed by the Bovie electrocautery for hemostasis. The fascia was incised at the midline and the fascial incision was extended bilaterally bluntly and the curved Diamond scissors.      The rectus muscles were then divided  at midline and the peritoneum was identified, tented up at its upper margin taking care to avoid the bladder, and then entered. The peritoneal incision was extended superiorly and inferiorly.     The Roderick O retractor was inserted and a transverse incision was made in the lower uterine segment using a new surgical blade. The uterine incision was extended cephalad and caudal using blunt dissection. The amniotic sac was entered and the amniotic fluid was noted to be clear.    The surgeon's hand was placed into the uterine cavity. The fetus was noted to be in cephalic presentation and the presenting part was grasped and delivered through the uterine incision with the assistance of fundal pressure. The infant's oral and nasal passages were bulb suctioned. After delivery delayed cord clamping was performed and then cut.  The infant was then passed off the table to the awaiting  staff.  Venous and arterial blood gas, cord blood, and portion of cord was obtained for analysis and routine blood testing. The placenta then delivered with gentle traction of the cord and uterine massage. The placenta was noted to be intact with a centrally inserted three-vessel cord. Oxytocin was administered by IV infusion to enhance uterine contraction. The uterus was exteriorized and cleared of all clots and debris by blunt curretage with a moist lap sponge. The uterine incision was reapproximated using a 0 Vicryl in a running locked fashion. A horizontal  imbricating stitch with  0-vicryl applied. The uterine incision was examined and noted to be hemostatic.        At this point, the posterior cul-de-sac was cleared of all clots. The uterus was replaced into the abdomen and the pericolic gutters were cleared of all clots. The uterine incision was  once again reexamined and noted to be hemostatic. The fascia was then reapproximated using 0 Vicryl in a running nonlocked fashion. The subcutaneous tissue was irrigated and cleared of all  clots and debris. Good hemostasis was noted. The subcutaneous tissue was re-approximated with 2-0 Plain suture. The skin incision was closed with 4-0 Monocryl. Good hemostasis was noted.  Exofin and Telfa dressing was placed on top of the skin incision in a sterile fashion as a surgical dressing. All needle, sponge, and instrument counts were noted to be correct x 2 at the end of the procedure.    The patient was then cleansed and transferred to the recovery room.    Overall, the patient tolerated the procedure well and is currently in stable condition in the PACU with her .        Dr. Harrell  present for the entire procedure.         Patient Disposition:  PACU         SIGNATURE: Yasmin Lerner MD  DATE: April 15, 2025  TIME: 8:34 PM

## 2025-04-16 LAB
ERYTHROCYTE [DISTWIDTH] IN BLOOD BY AUTOMATED COUNT: 11.9 % (ref 11.6–15.1)
HCT VFR BLD AUTO: 33.4 % (ref 34.8–46.1)
HGB BLD-MCNC: 11.5 G/DL (ref 11.5–15.4)
MCH RBC QN AUTO: 31.8 PG (ref 26.8–34.3)
MCHC RBC AUTO-ENTMCNC: 34.4 G/DL (ref 31.4–37.4)
MCV RBC AUTO: 92 FL (ref 82–98)
PLATELET # BLD AUTO: 198 THOUSANDS/UL (ref 149–390)
PMV BLD AUTO: 10.6 FL (ref 8.9–12.7)
RBC # BLD AUTO: 3.62 MILLION/UL (ref 3.81–5.12)
WBC # BLD AUTO: 24.65 THOUSAND/UL (ref 4.31–10.16)

## 2025-04-16 PROCEDURE — 99024 POSTOP FOLLOW-UP VISIT: CPT | Performed by: OBSTETRICS & GYNECOLOGY

## 2025-04-16 PROCEDURE — 85027 COMPLETE CBC AUTOMATED: CPT

## 2025-04-16 PROCEDURE — 99222 1ST HOSP IP/OBS MODERATE 55: CPT | Performed by: PHYSICIAN ASSISTANT

## 2025-04-16 RX ORDER — SIMETHICONE 80 MG
80 TABLET,CHEWABLE ORAL EVERY 6 HOURS PRN
Status: DISCONTINUED | OUTPATIENT
Start: 2025-04-16 | End: 2025-04-18 | Stop reason: HOSPADM

## 2025-04-16 RX ADMIN — ACETAMINOPHEN 975 MG: 325 TABLET, FILM COATED ORAL at 02:18

## 2025-04-16 RX ADMIN — KETOROLAC TROMETHAMINE 30 MG: 30 INJECTION, SOLUTION INTRAMUSCULAR; INTRAVENOUS at 20:14

## 2025-04-16 RX ADMIN — KETOROLAC TROMETHAMINE 30 MG: 30 INJECTION, SOLUTION INTRAMUSCULAR; INTRAVENOUS at 14:17

## 2025-04-16 RX ADMIN — ACETAMINOPHEN 975 MG: 325 TABLET, FILM COATED ORAL at 22:05

## 2025-04-16 RX ADMIN — KETOROLAC TROMETHAMINE 30 MG: 30 INJECTION, SOLUTION INTRAMUSCULAR; INTRAVENOUS at 08:15

## 2025-04-16 RX ADMIN — DOCUSATE SODIUM 100 MG: 100 CAPSULE, LIQUID FILLED ORAL at 08:12

## 2025-04-16 RX ADMIN — KETOROLAC TROMETHAMINE 30 MG: 30 INJECTION, SOLUTION INTRAMUSCULAR; INTRAVENOUS at 02:19

## 2025-04-16 RX ADMIN — ACETAMINOPHEN 975 MG: 325 TABLET, FILM COATED ORAL at 08:11

## 2025-04-16 RX ADMIN — DOCUSATE SODIUM 100 MG: 100 CAPSULE, LIQUID FILLED ORAL at 18:25

## 2025-04-16 RX ADMIN — Medication 1 TABLET: at 08:12

## 2025-04-16 RX ADMIN — ACETAMINOPHEN 975 MG: 325 TABLET, FILM COATED ORAL at 14:17

## 2025-04-16 RX ADMIN — OXYCODONE HYDROCHLORIDE 5 MG: 5 TABLET ORAL at 11:21

## 2025-04-16 NOTE — ASSESSMENT & PLAN NOTE
Isaura is a 33yo G1 at 35w1d admitted for obs in setting of NRFHT.   Presented to triage initially for r/o ROM which was negative. TAYLOR normal at 15.08cm  Initially presented with one late deceleration followed by a prolonged 2-3 minute deceleration. Continued to have intermittent prolonged decelerations overnight.     Plan:  Continuous fetal monitoring  Consider BTM per shared-decision making  Regular diet order  MFM consult, recommendations appreciated

## 2025-04-16 NOTE — CONSULTS
Consultation - Acute Pain   Name: Isaura Ballesteros 34 y.o. female I MRN: [id number]  Unit/Bed#: -01 I Date of Admission: 2025   Date of Service: 2025 I Hospital Day: 1   Inpatient consult to Acute Pain Service  Consult performed by: Cristino Sinclair PA-C  Consult ordered by: Elise Allred MD        Physician Requesting Evaluation: Ariana Montalvo*   Reason for Evaluation / Principal Problem: Status post     Assessment & Plan  Status post primary low transverse  section  Acetaminophen 975 mg p.o. every 6 hours scheduled.  Ketorolac 30 mg IV every 6 hours scheduled followed by ibuprofen 600 mg p.o. every 6 hours scheduled.  Oxycodone 5 mg p.o. every 4 hours as needed moderate pain or 10 mg p.o. every 4 hours as needed severe pain.  Hydromorphone 0.5 mg IV every 2 hours as needed breakthrough pain.  Would discontinue this if used twice or less in 24 hours.  Nalbuphine 5 mg IV every 3 hours as needed pruritus.  As needed Narcan in the event that opioid reversal becomes necessary.  Bowel regimen to avoid opioid-induced constipation while on opioid pain medication.  I have discussed the above management plan in detail with the primary service.   Please contact the SecureChat role for the Acute Pain service with any questions/concerns.      APS will sign off at this time. Thank you for the consult. All opioids and other analgesics to be written at discretion of primary team. Please contact Acute Pain Service - via inSellyt from [phone] with additional questions or concerns. See [employer] or [employer] for additional contacts and after hours information.    History of Present Illness    HPI: Isaura Ballesteros is a 34 y.o. year old female who presents with minimal lower abdominal pain following low-transverse .  Had preoperative spinal with Duramorph.  No numbness, weakness, tingling of the lower extremities, nausea, vomiting, pruritus.    Current pain  location(s): Pain Score: 0  Pain Location/Orientation: Location: Incision  Pain Scale: Pain Assessment Tool: 0-10  Current Analgesic regimen:  Acetaminophen 975 mg p.o. every 6 hours scheduled.  Ketorolac 30 mg IV every 6 hours scheduled x 36 hours followed by ibuprofen 600 mg p.o. every 6 hours scheduled.  Oxycodone 5 mg p.o. every 4 hours as needed moderate pain or 10 mg p.o. every 4 hours as needed severe pain.  Hydromorphone 0.5 mg IV every 2 hours as needed breakthrough pain.  Nalbuphine 5 mg IV every 3 hours as needed pruritus    Pain History: None  Pain Management Physician: None  I have reviewed the patient's controlled substance dispensing history in the Prescription Drug Monitoring Program in compliance with the [facility] regulations before prescribing any controlled substances.     Review of Systems   Gastrointestinal:  Positive for abdominal pain.   All other systems reviewed and are negative.    Historical Information   Past Medical History:   Diagnosis Date    Acne vulgaris 04/05/2021    Asthma     childhood - no inhaler use    Diarrhea 04/05/2021    Encounter for long-term current use of medication 04/05/2021    Irregular menses 04/05/2021    Pigmented nevus 04/05/2021    Abdomen 15x7 mm Lower right back 10x8 mm Upper left back=2 nevi    Varicella     pt had chickenpox in childhood     Past Surgical History:   Procedure Laterality Date    APPENDECTOMY      MOLE REMOVAL      2 - both benign     Social History     Tobacco Use    Smoking status: Never    Smokeless tobacco: Never   Vaping Use    Vaping status: Never Used   Substance and Sexual Activity    Alcohol use: Not Currently     Alcohol/week: 12.0 standard drinks of alcohol     Types: 8 Glasses of wine, 4 Standard drinks or equivalent per week    Drug use: Not Currently     Types: Marijuana     Comment: pt has inactive medical marijuana care    Sexual activity: Yes     Partners: Male     E-Cigarette/Vaping    E-Cigarette Use Never User       E-Cigarette/Vaping Substances     Family History   Problem Relation Age of Onset    Parkinsonism Mother     No Known Problems Father     No Known Problems Sister     No Known Problems Brother     Stroke Maternal Grandmother     Heart disease Maternal Grandmother     Cancer Maternal Grandfather     No Known Problems Paternal Grandmother     Cancer Paternal Grandfather      Social History     Tobacco Use    Smoking status: Never    Smokeless tobacco: Never   Vaping Use    Vaping status: Never Used   Substance and Sexual Activity    Alcohol use: Not Currently     Alcohol/week: 12.0 standard drinks of alcohol     Types: 8 Glasses of wine, 4 Standard drinks or equivalent per week    Drug use: Not Currently     Types: Marijuana     Comment: pt has inactive medical marijuana care    Sexual activity: Yes     Partners: Male       Current Facility-Administered Medications:     acetaminophen (TYLENOL) tablet 975 mg, Q6H REBECCA    calcium carbonate (TUMS) chewable tablet 1,000 mg, Daily PRN    docusate sodium (COLACE) capsule 100 mg, BID    fentaNYL (SUBLIMAZE) injection 25 mcg, Q5 Min PRN    HYDROmorphone (DILAUDID) injection 0.5 mg, Q10 Min PRN    HYDROmorphone (DILAUDID) injection 0.5 mg, Q2H PRN    ketorolac (TORADOL) injection 30 mg, Q6H REBECCA **FOLLOWED BY** [START ON 4/17/2025] ibuprofen (MOTRIN) tablet 600 mg, Q6H    lactated ringers infusion, Continuous, Last Rate: Stopped (04/16/25 0230)    metoclopramide (REGLAN) injection 10 mg, Once PRN    metoclopramide (REGLAN) injection 5 mg, Q6H PRN    nalbuphine (NUBAIN) injection 5 mg, Q3H PRN    naloxone (NARCAN) injection 0.1 mg, Q3 min PRN    ondansetron (ZOFRAN) injection 4 mg, Once PRN    ondansetron (ZOFRAN) injection 4 mg, Q6H PRN    oxyCODONE (ROXICODONE) immediate release tablet 10 mg, Q4H PRN    oxyCODONE (ROXICODONE) IR tablet 5 mg, Q4H PRN    prenatal multivitamin tablet 1 tablet, Daily  Prior to Admission Medications   Prescriptions Last Dose Informant Patient  Reported? Taking?   Prenatal Vit-Fe Fumarate-FA (PRENATAL PO) 4/13/2025  Yes Yes   Sig: Take by mouth   clindamycin (CLEOCIN T) 1 % lotion 4/13/2025  Yes Yes   Sig: APPLY TO FACE IN THE MORNING   melatonin 1 MG CHEW 4/13/2025  Yes Yes   Sig: Chew daily at bedtime      Facility-Administered Medications: None     Lactose - food allergy    Objective :  Temp:  [97.2 °F (36.2 °C)-98.1 °F (36.7 °C)] 98.1 °F (36.7 °C)  HR:  [51-66] 64  BP: (102-138)/(61-87) 109/76  Resp:  [17-18] 18  SpO2:  [93 %-100 %] 98 %  O2 Device: None (Room air)    Physical Exam  Vitals and nursing note reviewed.   Constitutional:       General: She is awake. She is not in acute distress.     Appearance: She is not ill-appearing, toxic-appearing or diaphoretic.   Skin:     General: Skin is warm and dry.   Neurological:      Mental Status: She is alert and oriented to person, place, and time.      GCS: GCS eye subscore is 4. GCS verbal subscore is 5. GCS motor subscore is 6.   Psychiatric:         Attention and Perception: Attention normal.         Speech: Speech normal.         Behavior: Behavior normal. Behavior is cooperative.          Lab Results: I have reviewed the following results:  CrCl cannot be calculated (Patient's most recent lab result is older than the maximum 7 days allowed.).  Lab Results   Component Value Date    WBC 24.65 (H) 04/16/2025    HGB 11.5 04/16/2025    HCT 33.4 (L) 04/16/2025     04/16/2025         Component Value Date/Time    K 3.9 01/27/2023 0758     (H) 01/27/2023 0758    CO2 24 01/27/2023 0758    BUN 12 01/27/2023 0758    CREATININE 0.74 01/27/2023 0758         Component Value Date/Time    CALCIUM 8.9 01/27/2023 0758    ALKPHOS 54 01/27/2023 0758    AST 14 01/27/2023 0758    ALT 28 01/27/2023 0758    TP 6.9 01/27/2023 0758    ALB 3.7 01/27/2023 0758       Imaging Results Review: No pertinent imaging studies reviewed.  Other Study Results Review: No additional pertinent studies reviewed.    Administrative  Statements   I have spent a total time of 24 minutes in caring for this patient on the day of the visit/encounter including Risks and benefits of tx options, Instructions for management, Patient and family education, Importance of tx compliance, Risk factor reductions, Impressions, Counseling / Coordination of care, Documenting in the medical record, Reviewing/placing orders in the medical record (including tests, medications, and/or procedures), Obtaining or reviewing history  , and Communicating with other healthcare professionals .

## 2025-04-16 NOTE — PLAN OF CARE
Problem: PAIN - ADULT  Goal: Verbalizes/displays adequate comfort level or baseline comfort level  Description: Interventions:- Encourage patient to monitor pain and request assistance- Assess pain using appropriate pain scale- Administer analgesics based on type and severity of pain and evaluate response- Implement non-pharmacological measures as appropriate and evaluate response- Consider cultural and social influences on pain and pain management- Notify physician/advanced practitioner if interventions unsuccessful or patient reports new pain  Outcome: Progressing     Problem: INFECTION - ADULT  Goal: Absence or prevention of progression during hospitalization  Description: INTERVENTIONS:- Assess and monitor for signs and symptoms of infection- Monitor lab/diagnostic results- Monitor all insertion sites, i.e. indwelling lines, tubes, and drains- Monitor endotracheal if appropriate and nasal secretions for changes in amount and color- [place] appropriate cooling/warming therapies per order- Administer medications as ordered- Instruct and encourage patient and family to use good hand hygiene technique- Identify and instruct in appropriate isolation precautions for identified infection/condition  Outcome: Progressing  Goal: Absence of fever/infection during neutropenic period  Description: INTERVENTIONS:- Monitor WBC  Outcome: Progressing     Problem: SAFETY ADULT  Goal: Patient will remain free of falls  Description: INTERVENTIONS:- Educate patient/family on patient safety including physical limitations- Instruct patient to call for assistance with activity - Keep Call bell within reach- Keep bed low and locked with side rails adjusted as appropriate- Keep care items and personal belongings in reach.  Outcome: Progressing  Goal: Maintain or return to baseline ADL function  Description: INTERVENTIONS:-  Assess patient's ability to carry out ADLs; assess patient's baseline for ADL function and identify physical  deficits which impact ability to perform ADLs (bathing, care of mouth/teeth, toileting, grooming, dressing, etc.)- Assess/evaluate cause of self-care deficits - Assess range of motion- Assess patient's mobility; develop plan if impaired- Assess patient's need for assistive devices and provide as appropriate- Encourage maximum independence but intervene and supervise when necessary- Involve family in performance of ADLs- Assess for home care needs following discharge - Consider OT consult to assist with ADL evaluation and planning for discharge- Provide patient education as appropriate  Outcome: Progressing  Goal: Maintains/Returns to pre admission functional level  Description: INTERVENTIONS:- Perform AM-PAC 6 Click Basic Mobility/ Daily Activity assessment daily.- Set and communicate daily mobility goal to care team and patient/family/caregiver.   Outcome: Progressing     Problem: Knowledge Deficit  Goal: Patient/family/caregiver demonstrates understanding of disease process, treatment plan, medications, and discharge instructions  Description: Complete learning assessment and assess knowledge base.Interventions:- Provide teaching at level of understanding- Provide teaching via preferred learning methods  Outcome: Progressing     Problem: DISCHARGE PLANNING  Goal: Discharge to home or other facility with appropriate resources  Description: INTERVENTIONS:- Identify barriers to discharge w/patient and caregiver- Arrange for needed discharge resources and transportation as appropriate- Identify discharge learning needs (meds, wound care, etc.)- Arrange for interpretive services to assist at discharge as needed- Refer to Case Management Department for coordinating discharge planning if the patient needs post-hospital services based on physician/advanced practitioner order or complex needs related to functional status, cognitive ability, or social support system  Outcome: Progressing

## 2025-04-16 NOTE — ASSESSMENT & PLAN NOTE
Acetaminophen 975 mg p.o. every 6 hours scheduled.  Ketorolac 30 mg IV every 6 hours scheduled followed by ibuprofen 600 mg p.o. every 6 hours scheduled.  Oxycodone 5 mg p.o. every 4 hours as needed moderate pain or 10 mg p.o. every 4 hours as needed severe pain.  Hydromorphone 0.5 mg IV every 2 hours as needed breakthrough pain.  Would discontinue this if used twice or less in 24 hours.  Nalbuphine 5 mg IV every 3 hours as needed pruritus.  As needed Narcan in the event that opioid reversal becomes necessary.  Bowel regimen to avoid opioid-induced constipation while on opioid pain medication.

## 2025-04-16 NOTE — PROGRESS NOTES
Progress Note - OB/GYN   Name: Isaura Ballesteros 34 y.o. female I MRN: [id number]  Unit/Bed#: -01 I Date of Admission: 2025   Date of Service: 2025 I Hospital Day: 1     Assessment & Plan  Non-reassuring fetal heart tones complicating pregnancy, antepartum  Isaura is a 33yo G1 at 35w1d admitted for obs in setting of NRFHT.   Presented to triage initially for r/o ROM which was negative. TAYLOR normal at 15.08cm  Initially presented with one late deceleration followed by a prolonged 2-3 minute deceleration. Continued to have intermittent prolonged decelerations overnight.     Plan:  Continuous fetal monitoring  Consider BTM per shared-decision making  Regular diet order  MFM consult, recommendations appreciated  Status post primary low transverse  section  Prenatal panel completed  NIPS low risk  Continuous fetal monitoring  Growth on 25: Normal interval growth  AC             28.61 cm        32 weeks 4 days* (26%)  BPD             8.85 cm        35 weeks 6 days* (94%)  HC             30.04 cm        33 weeks 2 days* (11%)  Femur           6.11 cm        31 weeks 5 days* (5%)     HC/AC           1.05 [0.96 - 1.11]                 (56%)  FL/AC             21 [20 - 24]  FL/BPD            69 [71 - 87]  EFW [place] 4   2031 grams - 4 lbs 8 oz                 (19%)    Progress Note - OB/GYN   Isaura Ballesteros 34 y.o. female MRN: [id number]  Unit/Bed#: -01 Encounter: [id number]      Assessment/Plan    Isaura Ballesteros is a 34 y.o.  who is PPD 1 s/p LTCS at 35w1d       Disposition: Anticipate discharge home postpartum Day #2-3  Barriers to discharge: none       Subjective/Objective     Subjective: Postpartum state    Pain: well-controlled  Tolerating PO: yes  Voiding: no; had catheter removed at 6 AM  Flatus: yes  BM: no  Ambulating: yes  Breastfeeding: Breastfeeding  Chest pain: no  Shortness of breath: no  Leg pain: no  Lochia: WNL    Objective:     Vitals:  Vitals:    04/15/25  2250 04/16/25 0444 04/16/25 0906 04/16/25 1215   BP: 119/75 102/61 109/76 119/68   BP Location: Right arm Right arm     Pulse: 60 59 64 64   Resp: 18 18 18 18   Temp: 98 °F (36.7 °C) 98.1 °F (36.7 °C) 98.1 °F (36.7 °C) 98 °F (36.7 °C)   TempSrc: Oral Oral Oral Oral   SpO2: 98% 98% 98% 98%   Weight:       Height:           Physical Exam:   GEN: appears well, alert and oriented x 3, pleasant and cooperative   CV: Regular rate  RESP: non labored breathing  ABDOMEN: soft, no tenderness, no distention, Uterine fundus firm and non-tender, at the umbilicus, incision c/d/i  EXTREMITIES: non-tender  NEURO Alert and oriented to person, place, and time.       Lab Results   Component Value Date    WBC 24.65 (H) 04/16/2025    HGB 11.5 04/16/2025    HCT 33.4 (L) 04/16/2025    MCV 92 04/16/2025     04/16/2025         Tatiana Castro PA-C  Obstetrics & Gynecology  04/16/25

## 2025-04-17 PROCEDURE — 99024 POSTOP FOLLOW-UP VISIT: CPT | Performed by: STUDENT IN AN ORGANIZED HEALTH CARE EDUCATION/TRAINING PROGRAM

## 2025-04-17 RX ADMIN — IBUPROFEN 600 MG: 600 TABLET ORAL at 09:02

## 2025-04-17 RX ADMIN — OXYCODONE HYDROCHLORIDE 5 MG: 5 TABLET ORAL at 21:16

## 2025-04-17 RX ADMIN — SIMETHICONE 80 MG: 80 TABLET, CHEWABLE ORAL at 03:59

## 2025-04-17 RX ADMIN — IBUPROFEN 600 MG: 600 TABLET ORAL at 15:21

## 2025-04-17 RX ADMIN — KETOROLAC TROMETHAMINE 30 MG: 30 INJECTION, SOLUTION INTRAMUSCULAR; INTRAVENOUS at 02:15

## 2025-04-17 RX ADMIN — DOCUSATE SODIUM 100 MG: 100 CAPSULE, LIQUID FILLED ORAL at 09:02

## 2025-04-17 RX ADMIN — ACETAMINOPHEN 975 MG: 325 TABLET, FILM COATED ORAL at 03:56

## 2025-04-17 RX ADMIN — ACETAMINOPHEN 975 MG: 325 TABLET, FILM COATED ORAL at 10:37

## 2025-04-17 RX ADMIN — IBUPROFEN 600 MG: 600 TABLET ORAL at 20:36

## 2025-04-17 RX ADMIN — Medication 1 TABLET: at 09:02

## 2025-04-17 RX ADMIN — DOCUSATE SODIUM 100 MG: 100 CAPSULE, LIQUID FILLED ORAL at 20:36

## 2025-04-17 NOTE — PROGRESS NOTES
Progress Note - OB/GYN   Name: Isaura Ballesteros 34 y.o. female I MRN: [id number]  Unit/Bed#: -01 I Date of Admission: 2025   Date of Service: 2025 I Hospital Day: 2     Assessment & Plan  Status post primary low transverse  section  Continue routine post partum care  QBL 318cc, HgB 11.4 -> 11.5  Pain well controlled: tylenol/motrin scheduled, rehana prn  Lochia within normal limits: continue to monitor   OOB: as able, encourage ambulation  Passing flatus  Voiding spontaneously  DVT ppx: SCD    Isaura Ballesteros is a 34 y.o.  who is PPD 2 s/p LTCS at 35w1d       Disposition: Anticipate discharge home postpartum Day #3 pending baby status  Barriers to discharge: none       Subjective/Objective     Subjective: Postpartum state    Pain: well-controlled  Tolerating PO: yes  Voiding: yes  Flatus: yes  BM: no  Ambulating: yes  Breastfeeding: Breastfeeding  Chest pain: no  Shortness of breath: no  Leg pain: no  Lochia: WNL    Objective:     Vitals:  Vitals:    25 1500 25 0025 25 0446   BP: 119/76 118/62 115/66 119/71   BP Location: Left arm Right arm Right arm Right arm   Pulse: 72 75 63 67   Resp: 18 18 18 18   Temp: 98 °F (36.7 °C) 98 °F (36.7 °C) 97.5 °F (36.4 °C) 97.5 °F (36.4 °C)   TempSrc: Oral Oral Oral Oral   SpO2: 98% 98% 99% 100%   Weight:       Height:           Physical Exam:   GEN: appears well, alert and oriented x 3, pleasant and cooperative   CV: Regular rate  RESP: non labored breathing  ABDOMEN: soft, no tenderness, no distention, Uterine fundus firm and non-tender, at the umbilicus, incision c/d/i  EXTREMITIES: non-tender  NEURO Alert and oriented to person, place, and time.       Lab Results   Component Value Date    WBC 24.65 (H) 2025    HGB 11.5 2025    HCT 33.4 (L) 2025    MCV 92 2025     2025         Sumi Yeung MD  Obstetrics & Gynecology  25

## 2025-04-17 NOTE — LACTATION NOTE
This note was copied from a baby's chart.  CONSULT - LACTATION  Baby Girl Ballesteros (Angela) 2 days female MRN: [id number]    [facility] AN NURSERY Room / Bed: (N)/(N) Encounter: [id number]    Maternal Information     MOTHER:  Isaura Ballesteros  Maternal Age: 34 y.o.  OB History: # 1 - Date: 04/15/25, Sex: Female, Weight: 2145 g (4 lb 11.7 oz), GA: 35w1d, Type: , Low Transverse, Apgar1: 8, Apgar5: 9, Living: Living, Birth Comments: None   Previous breast reduction surgery? No    Lactation history:   Has patient previously breast fed: No   How long had patient previously breast fed:     Previous breast feeding complications:       Past Surgical History:   Procedure Laterality Date    APPENDECTOMY      MOLE REMOVAL      2 - both benign    [place]  DELIVERY ONLY N/A 4/15/2025    Procedure:  SECTION ();  Surgeon: Ariana Torrez MD;  Location: AN ;  Service: Obstetrics       Birth information:  YOB: 2024   Time of birth: 4:49 PM   Sex: female   Delivery type: , Low Transverse   Birth Weight: 2145 g (4 lb 11.7 oz)   Percent of Weight Change: -6%     Gestational Age: 35w1d   [unfilled]    Reason for Consult:    Reason for Consult: Initial assessment (ext) - 20 min, Latch Assess (routine) - 10 min, Pump Follow Up - 5 min, Alternate Feeding - 10 min, Supplementation (routine) - 10 min, High Risk Infant - 10 min, Discharge (routine) - 10 min    Risk Factors:    Risk Factors: LPI    Breast and nipple assessment:   Breasts/Nipples  Date Pumping Initiated: 04/15/25  Left Breast: Soft, Filling  Right Breast: Soft, Filling  Left Nipple: Everted  Right Nipple: Everted  Intervention: Lanolin, Breast pump, Hand expression  Breastfeeding Status: Yes  Breastfeeding Progress: Not yet established, Other issues (comment) (SGA; High kcal. formula)    OB Lactation Tools:    Other OB Lactation Tools  Feeding Devices:  Bottle, Syringe, Lanolin, Pump (smaller flanges)    Breast Pump:    Breast Pump  Pump: Manual, Electric, Personal (smaller flanges)  Pump Review/Education: Setup, frequency, and cleaning, Milk storage, Other (Comment) (cycle and hands on pumping)  Initiated by: staff  Date Initiated: 04/15/25      [place] Assessment:  LPI; on glucose protocols;    Feeding assessment:  Parents have been feeding high calorie formula and pumping.  Tawana has not been on the breast since 4/15/25.    Feeding recommendations:   mixed feeding plan created for home. Isaura is encouraged to bring Tawana to the breast s2s or for non-nutritive suck after a paced bottle feeding so that her milk supply begins to meet Tawana's needs.     Isaura desires to excl. Breastfeed. Due to glucose protocols and LPI, High calorie formula has been introduced.     Isaura is currently using the multi-user pump. Flanges are visibly to large. Measured Isaura at a 17 mm flange. Once provided, more milk is expressed. Isaura expressed 4 mls of transitional colostrum from her breasts. Isaura has a [employer] Go 2 pump at home. Ed. On how to place the [employer] Go on the breast.     Ed. On paced bottle feeding, how to est. Milk supply and transitioning from the bottle to the breast.     Handouts:   Mother-led latch,   Latch Checklist  WHO How to mix formula,   Pumping Log,   LPI,   Increase Supply,  How to Cycle [facility] Pump,  Alt. Feeding,  Paced bottle,  Hand expression,        FEEDING PLAN:     Mix Feeds:   Start every feeding at the breast. Offer both breasts or one breast and use breast compressions to achieve active suckling. Once baby is not actively suckling, bring baby in upright position and offer expressed milk and/or artificial supplementation via alternative feeding method (syringe, finger, paced bottle feeding). Burp frequently between breasts and during paced bottle feeding. Once feed is complete, place baby back on breast for on-nutritive suck. Pump after the  feeding session to supplement with expressed milk at next feed.    Information given and discussed on breastfeeding a late  infant.  Discussed sleepiness, maintaining body temperature, the lack of stamina necessitating shorter feedings. Encouraged feeding every 2-3 hours around the clock followed by hand expressing/pumping.    Feeding Plan     1. Meet early feeding cues  2. Use hand expression, hand pump, reverse pressure softening techniques and nipple rolling techniques to assist with elongating the nipples.  3. Use massage, warmth, to stimulate breasts  4. Use pillows to bring baby to the breast (shoulders back, lower back support). Make sure you can see the latch.   5. Bring baby to breast skin to skin  6. Have baby's chest against mom's torso. Baby's chin should be deeply into the breast, and nose should touch the nipple. This position will  assist with deeper latch**  7. Place opposite hand under the breast and grab the breast like a taco. Your thumb should be in front of the baby's nose and behind the areola. Move baby not breast, and bring baby to breast when mouth is wide and deep latch is achieved.  8. Use breast compressions to stimulate suck  9. Once baby unlatches, bring him up to your chest and practice burping techniques.   10. Move baby to the opposite breast and follow steps 1-8 to latch deeply.   11. Pump after each feed to stimulate breasts and have expressed milk for next feeding. Do not pump for more than 10 min.     Feed expressed milk or formula as needed/desired. Paced bottle feeding technique is less stressful for your baby, prevents overfeeding and protects the breastfeeding relationship.  You can find a video about paced bottle feeding at www.lacted.org or MilkMob on YouSTORYS.JPube.            How to Fit Breast Pump Flanges - iable       https://lacted.org/videos/        (Scan QR code for [employer] - positions)   Review Milkmob on youBioTheryXube or scan QR code for MilkMob  video      Milk Mob        [employer] - positions    Ed. On 3rd party distributors that offer different flanges on-line. ie) [employer]. Names of reputable companies like [employer] and [employer] offer flanges for every double electric pump. Lactation Hub will also provide a set of 12 mm to 19 mm flanges to fit most flanges.Enc. To try smaller flange and place a small amount of lanolin where the tunnel and funnel meet.     Ed. On various flange sizes. Ed. On various flange circumferences that may fit the mother's breast better. Ed. On possible use of Pumping Pals or LacTeck flanges. Encouraged parents to call lactation once edema has dissipated to reassess the flange sizing.                    Christy Hume, MA 4/17/2025 2:03 PM

## 2025-04-17 NOTE — PLAN OF CARE
Problem: PAIN - ADULT  Goal: Verbalizes/displays adequate comfort level or baseline comfort level  Description: Interventions:- Encourage patient to monitor pain and request assistance- Assess pain using appropriate pain scale- Administer analgesics based on type and severity of pain and evaluate response- Implement non-pharmacological measures as appropriate and evaluate response- Consider cultural and social influences on pain and pain management- Notify physician/advanced practitioner if interventions unsuccessful or patient reports new pain  Outcome: Progressing     Problem: INFECTION - ADULT  Goal: Absence or prevention of progression during hospitalization  Description: INTERVENTIONS:- Assess and monitor for signs and symptoms of infection- Monitor lab/diagnostic results- Monitor all insertion sites, i.e. indwelling lines, tubes, and drains- Monitor endotracheal if appropriate and nasal secretions for changes in amount and color- [place] appropriate cooling/warming therapies per order- Administer medications as ordered- Instruct and encourage patient and family to use good hand hygiene technique- Identify and instruct in appropriate isolation precautions for identified infection/condition  Outcome: Progressing  Goal: Absence of fever/infection during neutropenic period  Description: INTERVENTIONS:- Monitor WBC  Outcome: Progressing     Problem: SAFETY ADULT  Goal: Patient will remain free of falls  Description: INTERVENTIONS:- Educate patient/family on patient safety including physical limitations- Instruct patient to call for assistance with activity - Keep Call bell within reach- Keep bed low and locked with side rails adjusted as appropriate- Keep care items and personal belongings in reach.  Outcome: Progressing  Goal: Maintain or return to baseline ADL function  Description: INTERVENTIONS:-  Assess patient's ability to carry out ADLs; assess patient's baseline for ADL function and identify physical  deficits which impact ability to perform ADLs (bathing, care of mouth/teeth, toileting, grooming, dressing, etc.)- Assess/evaluate cause of self-care deficits - Assess range of motion- Assess patient's mobility; develop plan if impaired- Assess patient's need for assistive devices and provide as appropriate- Encourage maximum independence but intervene and supervise when necessary- Involve family in performance of ADLs- Assess for home care needs following discharge - Consider OT consult to assist with ADL evaluation and planning for discharge- Provide patient education as appropriate  Outcome: Progressing  Goal: Maintains/Returns to pre admission functional level  Description: INTERVENTIONS:- Perform AM-PAC 6 Click Basic Mobility/ Daily Activity assessment daily.- Set and communicate daily mobility goal to care team and patient/family/caregiver.   Outcome: Progressing     Problem: Knowledge Deficit  Goal: Patient/family/caregiver demonstrates understanding of disease process, treatment plan, medications, and discharge instructions  Description: Complete learning assessment and assess knowledge base.Interventions:- Provide teaching at level of understanding- Provide teaching via preferred learning methods  Outcome: Progressing     Problem: DISCHARGE PLANNING  Goal: Discharge to home or other facility with appropriate resources  Description: INTERVENTIONS:- Identify barriers to discharge w/patient and caregiver- Arrange for needed discharge resources and transportation as appropriate- Identify discharge learning needs (meds, wound care, etc.)- Arrange for interpretive services to assist at discharge as needed- Refer to Case Management Department for coordinating discharge planning if the patient needs post-hospital services based on physician/advanced practitioner order or complex needs related to functional status, cognitive ability, or social support system  Outcome: Progressing

## 2025-04-17 NOTE — ASSESSMENT & PLAN NOTE
Continue routine post partum care  QBL 318cc, HgB 11.4 -> 11.5  Pain well controlled: tylenol/motrin scheduled, rehana prn  Lochia within normal limits: continue to monitor   OOB: as able, encourage ambulation  Passing flatus  Voiding spontaneously  DVT ppx: SCD

## 2025-04-17 NOTE — PLAN OF CARE
Problem: PAIN - ADULT  Goal: Verbalizes/displays adequate comfort level or baseline comfort level  Description: Interventions:- Encourage patient to monitor pain and request assistance- Assess pain using appropriate pain scale- Administer analgesics based on type and severity of pain and evaluate response- Implement non-pharmacological measures as appropriate and evaluate response- Consider cultural and social influences on pain and pain management- Notify physician/advanced practitioner if interventions unsuccessful or patient reports new pain  Outcome: Progressing     Problem: INFECTION - ADULT  Goal: Absence or prevention of progression during hospitalization  Description: INTERVENTIONS:- Assess and monitor for signs and symptoms of infection- Monitor lab/diagnostic results- Monitor all insertion sites, i.e. indwelling lines, tubes, and drains- Monitor endotracheal if appropriate and nasal secretions for changes in amount and color- [place] appropriate cooling/warming therapies per order- Administer medications as ordered- Instruct and encourage patient and family to use good hand hygiene technique- Identify and instruct in appropriate isolation precautions for identified infection/condition  Outcome: Progressing  Goal: Absence of fever/infection during neutropenic period  Description: INTERVENTIONS:- Monitor WBC  Outcome: Progressing     Problem: SAFETY ADULT  Goal: Patient will remain free of falls  Description: INTERVENTIONS:- Educate patient/family on patient safety including physical limitations- Instruct patient to call for assistance with activity - Keep Call bell within reach- Keep bed low and locked with side rails adjusted as appropriate- Keep care items and personal belongings in reach.  Outcome: Progressing  Goal: Maintain or return to baseline ADL function  Description: INTERVENTIONS:-  Assess patient's ability to carry out ADLs; assess patient's baseline for ADL function and identify physical  deficits which impact ability to perform ADLs (bathing, care of mouth/teeth, toileting, grooming, dressing, etc.)- Assess/evaluate cause of self-care deficits - Assess range of motion- Assess patient's mobility; develop plan if impaired- Assess patient's need for assistive devices and provide as appropriate- Encourage maximum independence but intervene and supervise when necessary- Involve family in performance of ADLs- Assess for home care needs following discharge - Consider OT consult to assist with ADL evaluation and planning for discharge- Provide patient education as appropriate  Outcome: Progressing  Goal: Maintains/Returns to pre admission functional level  Description: INTERVENTIONS:- Perform AM-PAC 6 Click Basic Mobility/ Daily Activity assessment daily.- Set and communicate daily mobility goal to care team and patient/family/caregiver.   Outcome: Progressing     Problem: Knowledge Deficit  Goal: Patient/family/caregiver demonstrates understanding of disease process, treatment plan, medications, and discharge instructions  Description: Complete learning assessment and assess knowledge base.Interventions:- Provide teaching at level of understanding- Provide teaching via preferred learning methods  Outcome: Progressing     Problem: DISCHARGE PLANNING  Goal: Discharge to home or other facility with appropriate resources  Description: INTERVENTIONS:- Identify barriers to discharge w/patient and caregiver- Arrange for needed discharge resources and transportation as appropriate- Identify discharge learning needs (meds, wound care, etc.)- Arrange for interpretive services to assist at discharge as needed- Refer to Case Management Department for coordinating discharge planning if the patient needs post-hospital services based on physician/advanced practitioner order or complex needs related to functional status, cognitive ability, or social support system  Outcome: Progressing

## 2025-04-18 VITALS
BODY MASS INDEX: 27.15 KG/M2 | OXYGEN SATURATION: 98 % | TEMPERATURE: 98.2 F | RESPIRATION RATE: 17 BRPM | SYSTOLIC BLOOD PRESSURE: 106 MMHG | HEART RATE: 67 BPM | HEIGHT: 67 IN | WEIGHT: 173 LBS | DIASTOLIC BLOOD PRESSURE: 65 MMHG

## 2025-04-18 PROCEDURE — NC001 PR NO CHARGE: Performed by: STUDENT IN AN ORGANIZED HEALTH CARE EDUCATION/TRAINING PROGRAM

## 2025-04-18 PROCEDURE — 99024 POSTOP FOLLOW-UP VISIT: CPT | Performed by: STUDENT IN AN ORGANIZED HEALTH CARE EDUCATION/TRAINING PROGRAM

## 2025-04-18 RX ORDER — IBUPROFEN 600 MG/1
600 TABLET, FILM COATED ORAL EVERY 6 HOURS
Qty: 30 TABLET | Refills: 0 | Status: SHIPPED | OUTPATIENT
Start: 2025-04-18 | End: 2025-05-08 | Stop reason: ALTCHOICE

## 2025-04-18 RX ORDER — DOCUSATE SODIUM 100 MG/1
100 CAPSULE, LIQUID FILLED ORAL 2 TIMES DAILY
Qty: 30 CAPSULE | Refills: 0 | Status: SHIPPED | OUTPATIENT
Start: 2025-04-18

## 2025-04-18 RX ORDER — ACETAMINOPHEN 325 MG/1
650 TABLET ORAL EVERY 6 HOURS SCHEDULED
Qty: 30 TABLET | Refills: 0 | Status: SHIPPED | OUTPATIENT
Start: 2025-04-18 | End: 2025-04-22

## 2025-04-18 RX ORDER — OXYCODONE HYDROCHLORIDE 5 MG/1
5 TABLET ORAL EVERY 4 HOURS PRN
Qty: 10 TABLET | Refills: 0 | Status: SHIPPED | OUTPATIENT
Start: 2025-04-18 | End: 2025-04-28

## 2025-04-18 RX ADMIN — IBUPROFEN 600 MG: 600 TABLET ORAL at 16:48

## 2025-04-18 RX ADMIN — ACETAMINOPHEN 975 MG: 325 TABLET, FILM COATED ORAL at 05:58

## 2025-04-18 RX ADMIN — ACETAMINOPHEN 975 MG: 325 TABLET, FILM COATED ORAL at 00:29

## 2025-04-18 RX ADMIN — ACETAMINOPHEN 975 MG: 325 TABLET, FILM COATED ORAL at 12:01

## 2025-04-18 RX ADMIN — DOCUSATE SODIUM 100 MG: 100 CAPSULE, LIQUID FILLED ORAL at 09:08

## 2025-04-18 RX ADMIN — IBUPROFEN 600 MG: 600 TABLET ORAL at 10:17

## 2025-04-18 RX ADMIN — Medication 1 TABLET: at 09:08

## 2025-04-18 RX ADMIN — IBUPROFEN 600 MG: 600 TABLET ORAL at 04:29

## 2025-04-18 NOTE — PLAN OF CARE
Problem: PAIN - ADULT  Goal: Verbalizes/displays adequate comfort level or baseline comfort level  Description: Interventions:- Encourage patient to monitor pain and request assistance- Assess pain using appropriate pain scale- Administer analgesics based on type and severity of pain and evaluate response- Implement non-pharmacological measures as appropriate and evaluate response- Consider cultural and social influences on pain and pain management- Notify physician/advanced practitioner if interventions unsuccessful or patient reports new pain  Outcome: Adequate for Discharge     Problem: INFECTION - ADULT  Goal: Absence or prevention of progression during hospitalization  Description: INTERVENTIONS:- Assess and monitor for signs and symptoms of infection- Monitor lab/diagnostic results- Monitor all insertion sites, i.e. indwelling lines, tubes, and drains- Monitor endotracheal if appropriate and nasal secretions for changes in amount and color- [place] appropriate cooling/warming therapies per order- Administer medications as ordered- Instruct and encourage patient and family to use good hand hygiene technique- Identify and instruct in appropriate isolation precautions for identified infection/condition  Outcome: Adequate for Discharge  Goal: Absence of fever/infection during neutropenic period  Description: INTERVENTIONS:- Monitor WBC  Outcome: Adequate for Discharge     Problem: SAFETY ADULT  Goal: Patient will remain free of falls  Description: INTERVENTIONS:- Educate patient/family on patient safety including physical limitations- Instruct patient to call for assistance with activity - Keep Call bell within reach- Keep bed low and locked with side rails adjusted as appropriate- Keep care items and personal belongings in reach.  Outcome: Adequate for Discharge  Goal: Maintain or return to baseline ADL function  Description: INTERVENTIONS:-  Assess patient's ability to carry out ADLs; assess patient's  baseline for ADL function and identify physical deficits which impact ability to perform ADLs (bathing, care of mouth/teeth, toileting, grooming, dressing, etc.)- Assess/evaluate cause of self-care deficits - Assess range of motion- Assess patient's mobility; develop plan if impaired- Assess patient's need for assistive devices and provide as appropriate- Encourage maximum independence but intervene and supervise when necessary- Involve family in performance of ADLs- Assess for home care needs following discharge - Consider OT consult to assist with ADL evaluation and planning for discharge- Provide patient education as appropriate  Outcome: Adequate for Discharge  Goal: Maintains/Returns to pre admission functional level  Description: INTERVENTIONS:- Perform AM-PAC 6 Click Basic Mobility/ Daily Activity assessment daily.- Set and communicate daily mobility goal to care team and patient/family/caregiver.   Outcome: Adequate for Discharge     Problem: Knowledge Deficit  Goal: Patient/family/caregiver demonstrates understanding of disease process, treatment plan, medications, and discharge instructions  Description: Complete learning assessment and assess knowledge base.Interventions:- Provide teaching at level of understanding- Provide teaching via preferred learning methods  Outcome: Adequate for Discharge     Problem: DISCHARGE PLANNING  Goal: Discharge to home or other facility with appropriate resources  Description: INTERVENTIONS:- Identify barriers to discharge w/patient and caregiver- Arrange for needed discharge resources and transportation as appropriate- Identify discharge learning needs (meds, wound care, etc.)- Arrange for interpretive services to assist at discharge as needed- Refer to Case Management Department for coordinating discharge planning if the patient needs post-hospital services based on physician/advanced practitioner order or complex needs related to functional status, cognitive ability, or  social support system  Outcome: Adequate for Discharge

## 2025-04-18 NOTE — LACTATION NOTE
This note was copied from a baby's chart.  CONSULT - LACTATION  Baby Girl Ballesteros (Angela) 3 days female MRN: [id number]    [facility] AN NURSERY Room / Bed: (N)/(N) Encounter: [id number]    Maternal Information     MOTHER:  Isaura Ballesteros  Maternal Age: 34 y.o.  OB History: # 1 - Date: 04/15/25, Sex: Female, Weight: 2145 g (4 lb 11.7 oz), GA: 35w1d, Type: , Low Transverse, Apgar1: 8, Apgar5: 9, Living: Living, Birth Comments: None   Previous breast reduction surgery? No    Lactation history:   Has patient previously breast fed: No   How long had patient previously breast fed:     Previous breast feeding complications:       Past Surgical History:   Procedure Laterality Date    APPENDECTOMY      MOLE REMOVAL      2 - both benign    [place]  DELIVERY ONLY N/A 4/15/2025    Procedure:  SECTION ();  Surgeon: Ariana Torrez MD;  Location: AN ;  Service: Obstetrics       Birth information:  YOB: 2024   Time of birth: 4:49 PM   Sex: female   Delivery type: , Low Transverse   Birth Weight: 2145 g (4 lb 11.7 oz)   Percent of Weight Change: -8%     Gestational Age: 35w1d   [unfilled]    Reason for Consult:    Reason for Consult: Discharge (ext) - 20 min    Risk Factors:    Risk Factors: LPI    Breast and nipple assessment:   Breasts/Nipples  Date Pumping Initiated: 04/15/25  Left Breast: Soft, Filling  Right Breast: Soft, Filling  Left Nipple: Everted, Other (Comment) (small)  Right Nipple: Everted, Other (Comment) (small)  Intervention: Hand expression, Lanolin, Breast pump  Breastfeeding Status: Yes  Breastfeeding Progress: Not yet established, Latch issues, Other issues (comment) (35 w)  Reasons for not Breastfeeding: Infant medical condition    OB Lactation Tools:    Other OB Lactation Tools  Feeding Devices: Bottle, Lanolin, Pump, Syringe    Breast Pump:    Breast Pump  Pump: Personal, Electric,  "Manual (willow g 2/ smaller flanges)  Pump Review/Education: Setup, frequency, and cleaning, Milk storage, Other (Comment) (cycle and hands on pumping)  Initiated by: Staff  Date Initiated: 04/15/25       Assessment:  sleeping in bassinet after feeding of expressed colostrum and \"topped off\" with Formula    Feeding assessment: latch difficulty (no latch; \"sleeps\" on the breast) Enc. To continue mixed feeding plan    Feeding recommendations:   Continue with mixed feeding for home. Isaura is encouraged to continue s2s for breast feeding attempt and for bottle feeding. Reviewed paced bottle feeding again to assist with milk supply. Enc. Baby and me appt. For outpatient. Due to high volume of colostrum, Dr. Arriaga agrees that Isaura can just feed her expressed milk. If Tawana desires more, she can feed some formula.     Enc. Baby and me appt - mom agrees. Sent EPIC msg. Request.     Enc. To call lactation for the next feeding prior to going home.     Provided education on growth spurts, when to introduce bottles; paced bottle feeding, and non-nutritive suck at the breast. Provided education on Signs of satiation. Encouraged to call lactation to observe a latch prior to discharge for reassurance. Encouraged to call baby and me with any questions and closely monitor output.          Christy Hume, MA 2025 9:59 AM  "

## 2025-04-18 NOTE — PROGRESS NOTES
Progress Note - OB/GYN   Name: Isaura Ballesteros 34 y.o. female I MRN: [id number]  Unit/Bed#: -01 I Date of Admission: 2025   Date of Service: 2025 I Hospital Day: 3     Assessment & Plan  Status post primary low transverse  section  Continue routine post partum care  QBL 318cc, HgB 11.4 -> 11.5  Pain well controlled: tylenol/motrin scheduled, rehana prn  Lochia within normal limits: continue to monitor   OOB: as able, encourage ambulation  Passing flatus  Voiding spontaneously  DVT ppx: SCD    Isaura Ballesteros is a 34 y.o.  who is PPD 3 s/p LTCS at 35w1d       Disposition: Anticipate discharge home postpartum Day #3 pending baby status  Barriers to discharge: none       Subjective/Objective     Subjective: Postpartum state    Pain: well-controlled  Tolerating PO: yes  Voiding: yes  Flatus: yes  BM: no  Ambulating: yes  Breastfeeding: Breastfeeding  Chest pain: no  Shortness of breath: no  Leg pain: no  Lochia: WNL    Objective:     Vitals:  Vitals:    25 0446 25 0852 25 1650 25 0012   BP: 119/71 116/78 123/70 115/73   BP Location: Right arm Right arm Right arm Right arm   Pulse: 67 72 75 66   Resp: 18    Temp: 97.5 °F (36.4 °C) 97.5 °F (36.4 °C) 97.9 °F (36.6 °C) 97.7 °F (36.5 °C)   TempSrc: Oral Oral Oral Oral   SpO2: 100% 100% 98% 100%   Weight:       Height:           Physical Exam:   GEN: appears well, alert and oriented x 3, pleasant and cooperative   CV: Regular rate  RESP: non labored breathing  ABDOMEN: soft, no tenderness, no distention, Uterine fundus firm and non-tender, at the umbilicus, incision c/d/i  EXTREMITIES: non-tender  NEURO Alert and oriented to person, place, and time.       Lab Results   Component Value Date    WBC 24.65 (H) 2025    HGB 11.5 2025    HCT 33.4 (L) 2025    MCV 92 2025     2025         Sumi Yeung MD  Obstetrics & Gynecology  25

## 2025-04-18 NOTE — PLAN OF CARE
Problem: PAIN - ADULT  Goal: Verbalizes/displays adequate comfort level or baseline comfort level  Description: Interventions:- Encourage patient to monitor pain and request assistance- Assess pain using appropriate pain scale- Administer analgesics based on type and severity of pain and evaluate response- Implement non-pharmacological measures as appropriate and evaluate response- Consider cultural and social influences on pain and pain management- Notify physician/advanced practitioner if interventions unsuccessful or patient reports new pain  Outcome: Progressing     Problem: INFECTION - ADULT  Goal: Absence or prevention of progression during hospitalization  Description: INTERVENTIONS:- Assess and monitor for signs and symptoms of infection- Monitor lab/diagnostic results- Monitor all insertion sites, i.e. indwelling lines, tubes, and drains- Monitor endotracheal if appropriate and nasal secretions for changes in amount and color- Holmen appropriate cooling/warming therapies per order- Administer medications as ordered- Instruct and encourage patient and family to use good hand hygiene technique- Identify and instruct in appropriate isolation precautions for identified infection/condition  Outcome: Progressing  Goal: Absence of fever/infection during neutropenic period  Description: INTERVENTIONS:- Monitor WBC  Outcome: Progressing     Problem: SAFETY ADULT  Goal: Patient will remain free of falls  Description: INTERVENTIONS:- Educate patient/family on patient safety including physical limitations- Instruct patient to call for assistance with activity - Keep Call bell within reach- Keep bed low and locked with side rails adjusted as appropriate- Keep care items and personal belongings in reach.  Outcome: Progressing  Goal: Maintain or return to baseline ADL function  Description: INTERVENTIONS:-  Assess patient's ability to carry out ADLs; assess patient's baseline for ADL function and identify physical  deficits which impact ability to perform ADLs (bathing, care of mouth/teeth, toileting, grooming, dressing, etc.)- Assess/evaluate cause of self-care deficits - Assess range of motion- Assess patient's mobility; develop plan if impaired- Assess patient's need for assistive devices and provide as appropriate- Encourage maximum independence but intervene and supervise when necessary- Involve family in performance of ADLs- Assess for home care needs following discharge - Consider OT consult to assist with ADL evaluation and planning for discharge- Provide patient education as appropriate  Outcome: Progressing  Goal: Maintains/Returns to pre admission functional level  Description: INTERVENTIONS:- Perform AM-PAC 6 Click Basic Mobility/ Daily Activity assessment daily.- Set and communicate daily mobility goal to care team and patient/family/caregiver.   Outcome: Progressing     Problem: Knowledge Deficit  Goal: Patient/family/caregiver demonstrates understanding of disease process, treatment plan, medications, and discharge instructions  Description: Complete learning assessment and assess knowledge base.Interventions:- Provide teaching at level of understanding- Provide teaching via preferred learning methods  Outcome: Progressing     Problem: DISCHARGE PLANNING  Goal: Discharge to home or other facility with appropriate resources  Description: INTERVENTIONS:- Identify barriers to discharge w/patient and caregiver- Arrange for needed discharge resources and transportation as appropriate- Identify discharge learning needs (meds, wound care, etc.)- Arrange for interpretive services to assist at discharge as needed- Refer to Case Management Department for coordinating discharge planning if the patient needs post-hospital services based on physician/advanced practitioner order or complex needs related to functional status, cognitive ability, or social support system  Outcome: Progressing

## 2025-04-19 PROCEDURE — 88307 TISSUE EXAM BY PATHOLOGIST: CPT | Performed by: PATHOLOGY

## 2025-04-20 ENCOUNTER — RESULTS FOLLOW-UP (OUTPATIENT)
Dept: OBGYN CLINIC | Facility: CLINIC | Age: 35
End: 2025-04-20

## 2025-04-21 ENCOUNTER — TELEPHONE (OUTPATIENT)
Age: 35
End: 2025-04-21

## 2025-04-21 NOTE — TELEPHONE ENCOUNTER
Patient is calling for a letter for work with proof of delivery (via  or vaginal) and proof of birth.   Patient gave birth on 4/15/2025 with Dr Harrell.    Patient also sending a physician form to be completed and is aware of 7-10 business day turn around and fee.

## 2025-04-21 NOTE — UTILIZATION REVIEW
"NOTIFICATION OF INPATIENT ADMISSION   MATERNITY/DELIVERY AUTHORIZATION REQUEST   SERVICING FACILITY:   [facility]  Parent Child Health - L&D, [place], NICU  [address] [place] [address]  Tax ID: [id number]  NPI: [id number]   ATTENDING PROVIDER:  Attending Name and NPI#: Ariana Torrez Md [[id number]]  Address: [address] [place] [address]  Phone: [phone]   ADMISSION INFORMATION:  Place of Service: Inpatient [facility]  Place of Service Code: 21  Inpatient Admission Date/Time: 4/15/25  3:35 PM  Discharge Date/Time: 2025  5:00 PM  Admitting Diagnosis Code/Description:  Evaluation by medical service required [Z00.00]  Encounter for  delivery without indication [O82]     Mother: Isaura Ballesteros 1990 Estimated Date of Delivery: 25  Delivering clinician: Ariana Torrez   OB History          1    Para   1    Term   0       1    AB   0    Living   1         SAB   0    IAB   0    Ectopic   0    Multiple   0    Live Births   1               [place] Name & MRN:   Information for the patient's :  Tawana Mobley Sher [[id number]]    Delivery Information:  Sex: female  Delivered 4/15/2025 4:49 PM by , Low Transverse; Gestational Age: 35w1d     Measurements:  Weight: 4 lb 11.7 oz (2145 g);  Height: 17\"    APGAR 1 minute 5 minutes 10 minutes   Totals: 8 9       UTILIZATION REVIEW CONTACT:  Renetta Zamora, Utilization   Network Utilization Review Department  Phone: [phone]  Fax [phone]  Email: [email or web address]  Contact for approvals/pending authorizations, clinical reviews, and discharge.     PHYSICIAN ADVISORY SERVICES:  Medical Necessity Denial & Kwrp-ot-Dvmh Review  Phone: [phone]  Fax: [phone]  Email: [email or web address]     DISCHARGE SUPPORT TEAM:  For Patients Discharge Needs & Updates  Phone: " [phone] opt. [phone] Fax: [phone]  Email: [email or web address]        NOTIFICATION OF ADMISSION DISCHARGE   This is a Notification of Discharge from [facility]. Please be advised that this patient has been discharge from our facility. Below you will find the admission and discharge date and time including the patient’s disposition.   UTILIZATION REVIEW CONTACT:  Utilization Review Assistants  Network Utilization Review Department  Phone: [phone] x carefully listen to the prompts. All voicemails are confidential.  Email: [email or web address]     ADMISSION INFORMATION  PRESENTATION DATE: 4/14/2025 10:19 AM  OBERVATION ADMISSION DATE: 04/14/2025 1201  INPATIENT ADMISSION DATE: 4/15/25  3:35 PM   DISCHARGE DATE: 4/18/2025  5:00 PM   DISPOSITION:Home/Self Care    Network Utilization Review Department  ATTENTION: Please call with any questions or concerns to [phone] and carefully listen to the prompts so that you are directed to the right person. All voicemails are confidential.   For Discharge needs, contact Care Management DC Support Team at [phone] opt. 2  Send all requests for admission clinical reviews, approved or denied determinations and any other requests to dedicated fax number below belonging to the campus where the patient is receiving treatment. List of dedicated fax numbers for the Facilities:  FACILITY NAME UR FAX NUMBER   ADMISSION DENIALS (Administrative/Medical Necessity) [phone]   DISCHARGE SUPPORT TEAM ([facility]) [phone]   PARENT CHILD HEALTH (Maternity/NICU/Pediatrics) [phone]   [facility] [phone]   [facility] [phone]   [facility] [phone]   [facility] [phone]   [facility] [phone]   [facility] [phone]   [facility]  [phone]   [facility] [phone]   [facility] [phone]   [facility] [phone]   [facility] [phone]   [facility] [phone]

## 2025-04-24 ENCOUNTER — OFFICE VISIT (OUTPATIENT)
Dept: OBGYN CLINIC | Facility: CLINIC | Age: 35
End: 2025-04-24

## 2025-04-24 VITALS
HEART RATE: 80 BPM | OXYGEN SATURATION: 100 % | WEIGHT: 163 LBS | BODY MASS INDEX: 25.53 KG/M2 | SYSTOLIC BLOOD PRESSURE: 120 MMHG | DIASTOLIC BLOOD PRESSURE: 80 MMHG

## 2025-04-24 DIAGNOSIS — Z98.891 STATUS POST PRIMARY LOW TRANSVERSE CESAREAN SECTION: Primary | ICD-10-CM

## 2025-04-24 PROBLEM — Z34.93 PRENATAL CARE IN THIRD TRIMESTER: Status: RESOLVED | Noted: 2025-02-03 | Resolved: 2025-04-24

## 2025-04-24 RX ORDER — ACETAMINOPHEN 325 MG/1
650 TABLET ORAL EVERY 6 HOURS PRN
COMMUNITY

## 2025-04-24 NOTE — PROGRESS NOTES
Assessment/Plan:     Problem List       Pigmented nevus    Overview   Abdomen 15x7 mm  Lower right back 10x8 mm  Upper left back=2 nevi         Acne vulgaris    Atypical nevus of abdominal wall    Status post primary low transverse  section    Non-reassuring fetal heart tones complicating pregnancy, antepartum         Postpartum Incision Check Visit    Isaura Ballesteros is a  34 y.o.  patient, present to the office for her postpartum incision check. The patient underwent pCD on 4/15/25 for nonreassuring fetal status. She is breastfeeding and she is taking her prenatal vitamins.       Pain is well controlled on tylenol and motrin, has used breakthrough oxi once or twice.   Vaginal bleeding- spotting daily   Breastfeeding is going ok- good latch, unsure of milk supply, has follow up with baby and me       OB History    Para Term  AB Living   1 1 0 1 0 1   SAB IAB Ectopic Multiple Live Births   0 0 0 0 1      # Outcome Date GA Lbr Judah/2nd Weight Sex Type Anes PTL Lv   1  04/15/25 35w1d  2145 g (4 lb 11.7 oz) F CS-LTranv Spinal  JOHANN      Name: Tawana Mobley      Apgar1: 8  Apgar5: 9                Current Outpatient Medications on File Prior to Visit   Medication Sig    acetaminophen (TYLENOL) 325 mg tablet Take 650 mg by mouth every 6 (six) hours as needed for mild pain    clindamycin (CLEOCIN T) 1 % lotion APPLY TO FACE IN THE MORNING    docusate sodium (COLACE) 100 mg capsule Take 1 capsule (100 mg total) by mouth 2 (two) times a day    ibuprofen (MOTRIN) 600 mg tablet Take 1 tablet (600 mg total) by mouth every 6 (six) hours    oxyCODONE (Roxicodone) 5 immediate release tablet Take 1 tablet (5 mg total) by mouth every 4 (four) hours as needed for moderate pain for up to 10 days Max Daily Amount: 30 mg    Prenatal Vit-Fe Fumarate-FA (PRENATAL PO) Take by mouth    melatonin 1 MG CHEW Chew daily at bedtime (Patient not taking: Reported on 2025)     No current  facility-administered medications on file prior to visit.     Past Medical History:   Diagnosis Date    Acne vulgaris 2021    Asthma     childhood - no inhaler use    Diarrhea 2021    Encounter for long-term current use of medication 2021    Irregular menses 2021    Pigmented nevus 2021    Abdomen 15x7 mm Lower right back 10x8 mm Upper left back=2 nevi    Prenatal care in third trimester 2025    - Continue PNV  - Fetal kick counts reviewed  - Labs: UTD  -Pap: 24 - WNL (+) HRHPV type  neg; 24 - C/G neg  -Rh: pos  - Genetics: NIPS and msAFP neg  - Ultrasounds: f/u growth 32-34w - scheduled 25  - Tdap: will plan at 33 week appt  - Flu Shot: 24  - Rhogam: NA  - Delivery:  with epidural   - Contraception: likely POPs  - Breastfeeding: order for pump placed today     Varicella     pt had chickenpox in childhood     Social History     Tobacco Use   Smoking Status Never   Smokeless Tobacco Never     Patient Active Problem List    Diagnosis Date Noted    Non-reassuring fetal heart tones complicating pregnancy, antepartum 2025    Status post primary low transverse  section 2024    Atypical nevus of abdominal wall 2023    Pigmented nevus 2021    Acne vulgaris 2021     Immunization History   Administered Date(s) Administered    COVID-19 J&J (Gabe) vaccine 0.5 mL 2021    COVID-19 PFIZER VACCINE 0.3 ML IM 2021    Tdap 2016, 2025           Review of Systems   All other systems reviewed and are negative.      Objective:      /80 (BP Location: Left arm, Patient Position: Sitting, Cuff Size: Standard)   Pulse 80   Wt 73.9 kg (163 lb)   LMP 2024 (Exact Date)     Physical Exam  Constitutional:       Appearance: Normal appearance. She is well-developed. She is not ill-appearing or diaphoretic.   HENT:      Head: Normocephalic and atraumatic.   Eyes:      Extraocular Movements: Extraocular  movements intact.      Conjunctiva/sclera: Conjunctivae normal.   Cardiovascular:      Rate and Rhythm: Normal rate.   Pulmonary:      Effort: Pulmonary effort is normal. No respiratory distress.   Abdominal:      General: There is no distension.      Tenderness: There is no abdominal tenderness. There is no guarding.      Comments: Incision is clean, dry and intact   Well approximated with skin glue   Uterus is unable to be palpated due to small size    Musculoskeletal:      Cervical back: Normal range of motion and neck supple.   Neurological:      Mental Status: She is alert and oriented to person, place, and time.      Motor: Motor function is intact.   Psychiatric:         Mood and Affect: Mood and affect normal.                    Results for orders placed or performed during the hospital encounter of 04/14/25   Strep B DNA probe, amplification    Specimen: Vaginal/Rectal; Genital   Result Value Ref Range    Strep Group B Ag Unknown Negative, Unknown, None, Pending   L&D GREEN / YELLOW ON HOLD   Result Value Ref Range    Extra Tube Hold for add-ons.    CBC and differential   Result Value Ref Range    WBC 9.12 4.31 - 10.16 Thousand/uL    RBC 3.63 (L) 3.81 - 5.12 Million/uL    Hemoglobin 11.4 (L) 11.5 - 15.4 g/dL    Hematocrit 34.0 (L) 34.8 - 46.1 %    MCV 94 82 - 98 fL    MCH 31.4 26.8 - 34.3 pg    MCHC 33.5 31.4 - 37.4 g/dL    RDW 12.3 11.6 - 15.1 %    MPV 11.0 8.9 - 12.7 fL    Platelets 188 149 - 390 Thousands/uL    nRBC 0 /100 WBCs    Segmented % 77 (H) 43 - 75 %    Immature Grans % 1 0 - 2 %    Lymphocytes % 15 14 - 44 %    Monocytes % 7 4 - 12 %    Eosinophils Relative 0 0 - 6 %    Basophils Relative 0 0 - 1 %    Absolute Neutrophils 7.04 1.85 - 7.62 Thousands/µL    Absolute Immature Grans 0.05 0.00 - 0.20 Thousand/uL    Absolute Lymphocytes 1.37 0.60 - 4.47 Thousands/µL    Absolute Monocytes 0.61 0.17 - 1.22 Thousand/µL    Eosinophils Absolute 0.02 0.00 - 0.61 Thousand/µL    Basophils Absolute 0.03 0.00 -  0.10 Thousands/µL   RPR-Syphilis Screening (Total Syphilis IGG/IGM)   Result Value Ref Range    Treponema Pallidium Total Antibodies Non-reactive Non-reactive   CORD, Blood gas, venous   Result Value Ref Range    pH, Cord Pawel 7.346 7.190 - 7.490    pCO2, Cord Pawel 43.2 (H) 27.0 - 43.0 mm HG    pO2, Cord Pawel 15.2 15.0 - 45.0 mm HG    HCO3, Cord Pawel 23.1 12.2 - 28.6 mmol/L    Base Exc, Cord Pawel -2.6 (L) 1.0 - 9.0 mmol/L    O2 Cont, Cord Pawel 6.5 mL/dL    O2 HGB,VENOUS CORD 31.8 %   CORD, Blood gas, arterial   Result Value Ref Range    pH, Cord Art 7.304 7.230 - 7.430    pCO2, Cord Art 50.1 30.0 - 60.0    pO2, Cord Art 15.3 5.0 - 25.0 mm HG    HCO3, Cord Art 24.3 17.3 - 27.3 mmol/L    Base Exc, Cord Art -2.7 (L) 3.0 - 11.0 mmol/L    O2 Content, Cord Art 7.4 ml/dl    O2 Hgb, Arterial Cord 32.2 %   CBC   Result Value Ref Range    WBC 24.65 (H) 4.31 - 10.16 Thousand/uL    RBC 3.62 (L) 3.81 - 5.12 Million/uL    Hemoglobin 11.5 11.5 - 15.4 g/dL    Hematocrit 33.4 (L) 34.8 - 46.1 %    MCV 92 82 - 98 fL    MCH 31.8 26.8 - 34.3 pg    MCHC 34.4 31.4 - 37.4 g/dL    RDW 11.9 11.6 - 15.1 %    Platelets 198 149 - 390 Thousands/uL    MPV 10.6 8.9 - 12.7 fL   Type and screen   Result Value Ref Range    ABO Grouping O     Rh Factor Positive     Antibody Screen Negative     Specimen Expiration Date 20250417    Tissue Exam OB (Placenta) Only   Result Value Ref Range    Case Report       Surgical Pathology Report                         Case: [id number]                                  Authorizing Provider:  Ariana Moseley          Collected:           04/15/2025 165Ling Torrez MD                                                         Ordering Location:     [facility]        Received:            04/16/2025 0302                                     [facility] [facility]                                                                                  Delivery                                 "                                     Pathologist:           Dot Cardona MD                                                         Specimen:    Placenta                                                                                   Final Diagnosis       A. Placenta ( delivery):  - Evolving infarction (comprising less than 5% of placental volume)  - Third trimester, 321.3g placenta with three-vessel umbilical cord         Additional Information       All reported additional testing was performed with appropriately reactive controls.  These tests were developed and their performance characteristics determined by [facility] or appropriate performing facility, though some tests may be performed on tissues which have not been validated for performance characteristics (such as staining performed on alcohol exposed cell blocks and decalcified tissues).  Results should be interpreted with caution and in the context of the patients’ clinical condition. These tests may not be cleared or approved by the U.S. Food and Drug Administration, though the FDA has determined that such clearance or approval is not necessary. These tests are used for clinical purposes and they should not be regarded as investigational or for research. This laboratory has been approved by CLIA 88, designated as a high-complexity laboratory and is qualified to perform these tests.  .      Gross Description       A. The specimen is received in formalin, labeled with the patient's name and hospital number, and is designated \" placenta\".  It consists of a 15.0 x 13.2 x 3.8 cm irregular shaped gates placenta.  The 12.0 x 1.0 cm white trivascular umbilical cord displays a 3.1 cm in diameter false knot 8.0 cm from the cord insertion site.  The umbilical cord inserts eccentrically 2.0 cm from the disc edge with 3 twist per 10 cm.  The tan-pink semitranslucent membranes insert marginally.  The blue-purple shiny fetal " surface displays normal vasculature.  The amnion is partially torn from the chorion.  There is a trimmed weight of 321.3 g.  The maternal surface is disrupted and incomplete.  No adherent blood clot is identified.  Sectioning reveals red soft cut surfaces with a 3.0 x 1.2 x 0.5 cm red rubbery peripheral lesion and a 0.7 x 0.7 x 0.5 cm tan-red rubbery central lesion.  The occupy less than 5% of the total placenta parenchyma.  Representative sections are submitted as follows:    A1: Umbilical cord  A2: Fetal membranes  A3-A5: Placenta body  A6: Maternal surface slivers  A7: Central lesion  A8: Peripheral lesion    Note: The estimated total formalin fixation time based upon information provided by the submitting clinician and the standard processing schedule is under 72 hours.  ESorrentino      Clinical Information       Gestational Age: 35, 1  Fetal /  Indications: n/a  Maternal Indications: n/a  Placental Indications: n/a

## 2025-04-28 ENCOUNTER — OFFICE VISIT (OUTPATIENT)
Dept: POSTPARTUM | Facility: CLINIC | Age: 35
End: 2025-04-28
Payer: COMMERCIAL

## 2025-04-28 PROCEDURE — 99404 PREV MED CNSL INDIV APPRX 60: CPT | Performed by: PEDIATRICS

## 2025-04-28 NOTE — PATIENT INSTRUCTIONS
Continue to feed Tawana as you have been but implement paced bottle feeding method. Here is a good video to watch for tips. Videos - [employer]    Use tapered artificial nipple such as Lansinoh or pidgeon bottles.     Begin pumping every 2-3 hours to assist with production and increasing your expressed milk output to meet Tawana's demands.   How to cycle Spectra pump: Turn on the pump, press the 3 wavy lines to place pump on stimulation mode (high cycle, low vacuum). Set vacuum to comfort with light suction. Once milk begins to let down, press 3 wavy lines to change the setting to expression mode (low Cycle, High vacuum. Once milk flow diminishes, press 3 wavy lines and return pump setting to stimulation mode. Once you see milk flow again press 3 wavy lines button to return to expression mode. You may return to stimulation and expression mode for a 3rd and final time. Try to adjust cycle speed to see what yields the most milk production.     We measured you at 16mm today and would recommend using a 19mm size flange or flange insert. Pumping should not be uncomfortable and we want to see the nipple moving freely within the flange with little to no areola being pulled in the flange.     You may continue to bring Tawana to the breast as frequently as you feel comfortable, keeping the attempts short and positive if no latches are obtained. Here's a video on tips for obtaining a deep latch! Attaching Your Baby at the Breast - Video - [employer]     If you have any questions before your follow up on 5/8, feel free to send a message via [employer] or call the office!

## 2025-04-28 NOTE — PROGRESS NOTES
INITIAL BREAST FEEDING EVALUATION    Informant/Relationship: Isaura/Mom    Discussion of General Lactation Issues: Isaura would like support in latching Tawana and working on getting her supply up to be able to give Tawana breast milk and decrease formula usage.     Tawana is 13 days old today.        History:  Fertility Problem:no  Breast changes:yes - larger breast, darker areola, larger nipples  : no, C/S d/t heart decels on monitoring.   Full term:no, Induction at 35 weeks and 1 day   labor:no  First nursing/attempt < 1 hour after birth:no, within 2 hours  Skin to skin following delivery:yes - In recovery room  Breast changes after delivery:yes - Noticed fullness on Saturday or    Rooming in (infant in room with mother with exception of procedures, eg. Circumcision: yes - for duration of stay  Blood sugar issues:no  NICU stay:no  Jaundice:no  Phototherapy:no  Supplement given: (list supplement and method used as well as reason(s):yes - Similac via bottle on the first day of delivery due to size.     Past Medical History:   Diagnosis Date    Acne vulgaris 2021    Asthma     childhood - no inhaler use    Diarrhea 2021    Encounter for long-term current use of medication 2021    Irregular menses 2021    Pigmented nevus 2021    Abdomen 15x7 mm Lower right back 10x8 mm Upper left back=2 nevi    Prenatal care in third trimester 2025    - Continue PNV  - Fetal kick counts reviewed  - Labs: UTD  -Pap: 24 - WNL (+) HRHPV type 16/18/45 neg; 24 - C/G neg  -Rh: pos  - Genetics: NIPS and msAFP neg  - Ultrasounds: f/u growth 32-34w - scheduled 25  - Tdap: will plan at 33 week appt  - Flu Shot: 24  - Rhogam: NA  - Delivery:  with epidural   - Contraception: likely POPs  - Breastfeeding: order for pump placed today     Varicella     pt had chickenpox in childhood         Current Outpatient Medications:     acetaminophen (TYLENOL) 325 mg tablet,  "Take 650 mg by mouth every 6 (six) hours as needed for mild pain, Disp: , Rfl:     clindamycin (CLEOCIN T) 1 % lotion, APPLY TO FACE IN THE MORNING, Disp: , Rfl:     docusate sodium (COLACE) 100 mg capsule, Take 1 capsule (100 mg total) by mouth 2 (two) times a day, Disp: 30 capsule, Rfl: 0    ibuprofen (MOTRIN) 600 mg tablet, Take 1 tablet (600 mg total) by mouth every 6 (six) hours, Disp: 30 tablet, Rfl: 0    melatonin 1 MG CHEW, Chew daily at bedtime (Patient not taking: Reported on 2025), Disp: , Rfl:     oxyCODONE (Roxicodone) 5 immediate release tablet, Take 1 tablet (5 mg total) by mouth every 4 (four) hours as needed for moderate pain for up to 10 days Max Daily Amount: 30 mg, Disp: 10 tablet, Rfl: 0    Prenatal Vit-Fe Fumarate-FA (PRENATAL PO), Take by mouth, Disp: , Rfl:     Allergies   Allergen Reactions    Lactose - Food Allergy Other (See Comments)     Lactose intol       Social History     Substance and Sexual Activity   Drug Use Never    Types: Marijuana    Comment: pt has inactive medical marijuana care       Social History     Interval Breastfeeding History:    Frequency of breast feedin times/day, short feeds to have time to supplement  Does mother feel breastfeeding is effective: If no, explain: Sometimes  Does infant appear satisfied after nursing:If no, explain: \"Sometimes\", she will be fussy  Stooling pattern normal: Yes  Urinating frequently:Yes  Using shield or shells: No    Alternative/Artificial Feedings:   Bottle: Yes, Volume feeders with preemie nipples  Cup: No  Syringe/Finger: No           Formula Type: Similac radha-sure                     Amount: 30-50 mL            Breast Milk:                      Amount: 30-50mL            Frequency Q 2-3 Hr between feedings around the clock  Elimination Problems: Yes      Equipment:  Nipple Shield             Type: NA             Size: NA             Frequency of Use: NA  Pump            Type: Mikki gonzalez & Spectra S1            Frequency of " Use: Every 2-3 hours, 45mL total out  Shells            Type: NA            Frequency of use: NA    Equipment Problems: no    Mom:  Breast: Normal  Nipple Assessment in General: Normal: elongated/eraser, no discoloration and no damage noted.  Mother's Awareness of Feeding Cues                 Recognizes: Yes                  Verbalizes: Yes  Support System: FOB  History of Breastfeeding: Isaura has attempted to breastfeed delivery but has not had much ability to latch Tawana  Changes/Stressors/Violence: Tawana does not consistently latch and they have been attempting to triple feed.   Concerns/Goals: Isaura would like to either feed directly at the breast or be able to meet Tawana's needs with expressed breast-milk.     Problems with Mom: None observed    Physical Exam  Pulmonary:      Effort: Pulmonary effort is normal.   Neurological:      General: No focal deficit present.      Mental Status: She is alert and oriented to person, place, and time.   Psychiatric:         Mood and Affect: Mood normal.         Behavior: Behavior normal.         Thought Content: Thought content normal.         Judgment: Judgment normal.         Infant:  Behaviors: Alert  Color: Pink  Birth weight: 2145 grams  Current weight: 2200 grams     Problems with infant: Has not mastered sucking      General Appearance:  Alert, active, no distress                            Head:  Normocephalic, AFOF, sutures opposed                            Eyes:   Conjunctiva clear, no drainage                            Ears:   Normally placed, no anomolies                           Nose:   Septum intact, no drainage or erythema                          Mouth:  No lesions, Tawana has narrow gape but sticks tongue out well past the lower lip. She lifts her tongue to the mid mouth however a slight cleft in the tip of her tongue is present. Tawana lateralizes bilaterally but closes her jaw while doing so. She has good peristalic movement of the tongue while sucking  on my gloved finger, but she loses seal with each suck.Upon manual lift, the lingual frenulum is very elastic and located posteriorly to the tip of the tongue and well below the lower alveolar ridge.                    Neck:  Supple, symmetrical, trachea midline, no adenopathy; thyroid: no enlargement, symmetric, no tenderness/mass/nodules                Respiratory:  No grunting, flaring, retractions, breath sounds clear and equal           Cardiovascular:  Regular rate and rhythm. No murmur. Adequate perfusion/capillary refill. Femoral pulse present                  Abdomen:    Soft, non-tender, no masses, bowel sounds present, no HSM            Genitourinary:  Normal female genitalia, anus patent                         Spine:   No abnormalities noted       Musculoskeletal:   Full range of motion         Skin/Hair/Nails:   Skin warm, dry, and intact, no rashes or abnormal dyspigmentation or lesions               Neurologic:   No abnormal movement, tone appropriate for gestational age    [facility] Assessment for Lingual Frenulum Function    Appearance Items Function Items   Appearance of tongue when lifted  1: Slight cleft in tip apparent   Lateralization  2: Complete   Elasticity of frenulum  2: Very elastic   Lift of tongue  2: Tip to mid-mouth     Length of lingual frenulum when tongue lifted  lingual frenulum length: 2: > 1cm     Extension of tongue  2: Tip over lower lip   Attachment of lingual frenulum to tongue  2: Posterior to tip   Spread of anterior tongue  1: Moderate of partial   Attachment of lingual frenulum to inferior alveolar ridge  2: Attached to floor of mouth or well below ridge Cupping  1: Side edges only, moderate cup   Ankyloglossia Grading:  Class I: mild, 12-16 mm  Class II: moderate, 8-11 mm  Class III: severe, 3-7 mm  ClassIV: complete, less than 3 mm Peristalsis  1: Partial, originating posterior to tip       SCORE:    Appearance: 9 (<8=ankyloglossia)  Function: 11 (<11=ankyloglossia)  "Snapback  2: None       Latch:  Efficiency:               Lips Flanged: Neutral              Depth of latch: Shallow              Audible Swallow: No              Visible Milk: Yes              Wide Open/ Asymmetrical: No              Suck Swallow Cycle: Breathing: Unlabored, Coordinated: NA  Nipple Assessment after latch: Normal: elongated/eraser, no discoloration and no damage noted.  Latch Problems: Tawana obtained a shallow latch and would only perform a few sucks before becoming frustrated     Position:  Infant's Ergonomics/Body               Body Alignment: Yes, After adjustment               Head Supported: Yes               Close to Mom's body/ Lifted/ Supported: Yes, after adjustment               Mom's Ergonomics/Body: Yes, After adjustment                           Supported: Yes, with pillow                           Sitting Back: Yes, after adjustment                           Brings Baby to her breast: No, leans over baby  Positioning Problems: Isaura positions Tawana far away from the breast with nipple aligned with mouth.       Handouts:   None    Education:  Reviewed Latch: Reviewed what a deep asymmetrical latch looks like  Reviewed Positioning for Dyad: Positioning of belly to belly and aligning nipple to upper lip with chin leading for latch  Reviewed Frequency/Supply & Demand: Reviewed pumping every 2-3 hours for a total of 8-12 times/day  Reviewed Infant:Cues and varied States of Awareness  Reviewed Alternative/Artificial Feedings: Reviewed paced bottle feeding  Reviewed Equipment: Reviewed Spectra pump and sized for 19mm flange inserts      Plan:    Plan for breastfeeding    Reassurance and support given.    Demonstrated ways to hold breast to facilitate latch: simple lift or \"sandwich\" v \"taco\".   We have reviewed paced bottle feeding.   I have reviewed pumping every 2-3 hours with Isaura to meet her plans of providing more breastmilk for Tawana.   I have encouraged Isaura to bring Tawana " to the breast when she is feeling up to it.   Isaura has scheduled follow up to reassess how milk expression is going.       I have spent 90 minutes with Patient and family today in which greater than 50% of this time was spent in counseling/coordination of care regarding Documenting in the medical record and Obtaining or reviewing history  .

## 2025-04-29 NOTE — PROGRESS NOTES
I have reviewed the notes, assessments, and/or procedures performed by Marina Joiner RN, CLC, I concur with her/his documentation of Isaura Stevenson MD 04/29/25

## 2025-05-08 ENCOUNTER — POSTPARTUM VISIT (OUTPATIENT)
Dept: OBGYN CLINIC | Facility: CLINIC | Age: 35
End: 2025-05-08

## 2025-05-08 ENCOUNTER — OFFICE VISIT (OUTPATIENT)
Dept: POSTPARTUM | Facility: CLINIC | Age: 35
End: 2025-05-08
Payer: COMMERCIAL

## 2025-05-08 VITALS
HEIGHT: 67 IN | SYSTOLIC BLOOD PRESSURE: 120 MMHG | BODY MASS INDEX: 24.96 KG/M2 | DIASTOLIC BLOOD PRESSURE: 82 MMHG | WEIGHT: 159 LBS

## 2025-05-08 PROBLEM — O36.8390 NON-REASSURING FETAL HEART TONES COMPLICATING PREGNANCY, ANTEPARTUM: Status: RESOLVED | Noted: 2025-04-14 | Resolved: 2025-05-08

## 2025-05-08 PROBLEM — Z98.891 STATUS POST PRIMARY LOW TRANSVERSE CESAREAN SECTION: Status: RESOLVED | Noted: 2024-11-11 | Resolved: 2025-05-08

## 2025-05-08 PROCEDURE — 99404 PREV MED CNSL INDIV APPRX 60: CPT | Performed by: PEDIATRICS

## 2025-05-08 PROCEDURE — 99024 POSTOP FOLLOW-UP VISIT: CPT | Performed by: STUDENT IN AN ORGANIZED HEALTH CARE EDUCATION/TRAINING PROGRAM

## 2025-05-08 NOTE — PROGRESS NOTES
[facility]   Postpartum Visit  Sarika Bruce MD  25      Assessment:  Isaura Ballesteros is a 34 y.o. who is 3 weeks postpartum from a 1LTCS at 35 weeks for the indication of nonreassuring fetal heart tones with a normal postpartum examination.  Denison score: 0    Plan:  1. Contraception: We reviewed progesterone only contraceptive options and condom use at this time she is not ready to begin thinking about contraception.  We reviewed that fertility tracking is typically not reliable in the first 6 or so months send we did discuss recommended pregnancy spacing of 18 months to reduce her risk of  birth and SGA.  2. Annual exam due in August; Last Pap: 2024- NLM, HPV (non 16/18) positive, Due on 2025  3. Lactation consult, [facility] information discussed.   4. Increase activity as tolerated  5.  Return to office in 3 months for annual well woman exam    Subjective     Isaura Ballesteros is a 34 y.o. female who presents for a postpartum visit. She is 3 weeks postpartum following a primary  section, low transverse incision. I have fully reviewed the prenatal and intrapartum course. The delivery was at 35 gestational weeks for the indication of recurrent prolonged decelerations and concerns for fetal well being. She delivered a female/ male , weight 4lb 11oz with APGARS of 8 and 9 at 1 and 5 minutes respectively. Anesthesia:     Lochia is minimal spotting . Bowel function is normal. Bladder function is normal.Desired contraception method is  undecided .     Postpartum depression screening: negative.    Baby's course has been overall uncomplicated.   Baby is feeding by breast milk and bottle-has been having challenges given the small size of baby and difficulty latching.  Has a lot a low milk supply at this time and we did review risk factors given delivery at 35 weeks and  for lower milk supply.  I encouraged her to continue breast pumping regularly and  "giving breastmilk and formula as needed and letting her know she is doing a great job.    Last Pap: 8/2024- NLM, HPV (non 16/18) positive  Gestational Diabetes: no  Pregnancy Complications: none    The following portions of the patient's history were reviewed and updated as appropriate: allergies, current medications, past family history, past medical history, past social history, past surgical history, and problem list.      Current Outpatient Medications:     acetaminophen (TYLENOL) 325 mg tablet, Take 650 mg by mouth every 6 (six) hours as needed for mild pain, Disp: , Rfl:     clindamycin (CLEOCIN T) 1 % lotion, APPLY TO FACE IN THE MORNING, Disp: , Rfl:     docusate sodium (COLACE) 100 mg capsule, Take 1 capsule (100 mg total) by mouth 2 (two) times a day, Disp: 30 capsule, Rfl: 0    Prenatal Vit-Fe Fumarate-FA (PRENATAL PO), Take by mouth, Disp: , Rfl:     ibuprofen (MOTRIN) 600 mg tablet, Take 1 tablet (600 mg total) by mouth every 6 (six) hours (Patient not taking: Reported on 5/8/2025), Disp: 30 tablet, Rfl: 0    melatonin 1 MG CHEW, Chew daily at bedtime (Patient not taking: Reported on 4/24/2025), Disp: , Rfl:     Allergies   Allergen Reactions    Lactose - Food Allergy Other (See Comments)     Lactose intol       Review of Systems  Constitutional: no fever, feels well  Breasts: no complaints of breast pain, breast lump, or nipple discharge  Gastrointestinal: no complaints nausea, vomiting  Genitourinary: as noted in HPI.  Neurological: no complaints of headache    Objective      /82 (BP Location: Left arm, Patient Position: Sitting, Cuff Size: Standard)   Ht 5' 7\" (1.702 m)   Wt 72.1 kg (159 lb)   Breastfeeding Yes Comment: Supplementing Bottlefeeding - formula  BMI 24.90 kg/m²   Physical Exam  Vitals reviewed.   Constitutional:       General: She is not in acute distress.     Appearance: Normal appearance. She is not ill-appearing or diaphoretic.   HENT:      Head: Normocephalic and " atraumatic.   Cardiovascular:      Rate and Rhythm: Normal rate.   Pulmonary:      Effort: Pulmonary effort is normal. No respiratory distress.   Abdominal:      Palpations: Abdomen is soft.      Tenderness: There is no abdominal tenderness. There is no guarding or rebound.      Comments: Incision is healing well-clean/dry/intact with no overlying erythema or discharge from the site.   Musculoskeletal:      Right lower leg: No edema.      Left lower leg: No edema.   Skin:     General: Skin is warm and dry.   Neurological:      Mental Status: She is alert and oriented to person, place, and time.   Psychiatric:         Mood and Affect: Mood normal.         Behavior: Behavior normal.

## 2025-05-08 NOTE — PATIENT INSTRUCTIONS
-Offer Tawana the breast when feeding motivated to do so.   -Pump to replaced missed or infective feedings at the breast.   -Use lowest effective suction when pumping, cycle pump settings to mimic baby suckling/drinking at the breast  -Goal to pump at least 8 times per day, try for every 2-3 sanju during the day and allow for one 5 hour break to sleep overnight.   -Follow up in two weeks for ongoing feeding and pumping support.

## 2025-05-08 NOTE — PROGRESS NOTES
"BREAST FEEDING FOLLOW UP VISIT    Informant/Relationship: Isaura (mom/self) and Tyler (FOB)     Discussion of General Lactation Issues: The past week Isaura has done more focus on pumping and has not really attempted breastfeeding. She is not seeing supply increasing. She is \"barely getting by\" to the next week, and often will need two pumping sessions to get a \"full feeding\" with breast milk. Isaura ordered a 19 mm inserts and using the Spectra pump predominately.     Infant, Tawana,  is 23 days old today.    Interval Breastfeeding History:    Frequency of breast feeding: none in the past week   Does mother feel breastfeeding is effective: No  Does infant appear satisfied after nursing:No  Stooling pattern normal:Yes  Urinating frequently:Yes  Using shield or shells:No    Alternative/Artificial Feedings:   Bottle: Yes, Lansinoh bottles, flow may be too fast. Using Dr. Castaneda's preemie nipples.   Cup: No  Syringe/Finger: No           Formula Type: Similac Neosure                      Amount: 35-50 mL             Breast Milk:                      Amount: offering this first when available             Frequency Q 2-3 Hr between feedings  Elimination Problems: No      Equipment:    Pump            Type: Spectra S1 and Wayne Go             Frequency of Use: 3-4 hours, for 30 minutes       Equipment Problems: yes is not seeing volume increase       Mom:  Breast: Normal  Nipple Assessment in General: Normal: elongated/eraser, no discoloration and no damage noted.  Mother's Awareness of Feeding Cues                 Recognizes: Yes                  Verbalizes: Yes  Support System: FOB  History of Breastfeeding: first time breastfeeding   Changes/Stressors/Violence: Baby is not latching well, Mom is not seeing increase in pumping output.   Concerns/Goals: Isaura would like to either feed directly at the breast or be able to meet Tawana's needs with expressed breast-milk.     Problems with Mom: pumping output not meeting " baby's needs.     Physical Exam  Constitutional:       Appearance: Normal appearance.   HENT:      Head: Normocephalic.   Pulmonary:      Effort: Pulmonary effort is normal.   Musculoskeletal:         General: Normal range of motion.      Cervical back: Normal range of motion.   Neurological:      General: No focal deficit present.      Mental Status: She is alert and oriented to person, place, and time.   Skin:     General: Skin is warm.      Capillary Refill: Capillary refill takes less than 2 seconds.   Psychiatric:         Mood and Affect: Mood normal.         Behavior: Behavior normal.         Thought Content: Thought content normal.         Judgment: Judgment normal.       Infant:  Behaviors: Fussy  Color: Pink  Birth weight: 2145 g   Current weight: 2415 g     Problems with infant: fussy, tense, shallow attachment, difficultly maintaining wide attachment       General Appearance:  Alert, active, no distress                            Head:  Normocephalic, AFOF, sutures opposed                            Eyes:   Conjunctiva clear, no drainage                            Ears:   Normally placed, no anomolies                           Nose:   Septum intact, no drainage or erythema                          Mouth:  No lesions. Tongue is at the roof of her mouth at rest, lateralizes well, extends past her lip. Jaw is tight, she bites down quickly when finger placed in her mouth. Full cup on gloved finger and peristalsis is felt when she sucks. Rounded tongue tip on manual lift.                    Neck:  Supple, symmetrical, trachea midline                Respiratory:  No grunting, flaring, retractions, breath sounds clear and equal           Cardiovascular:  Regular rate and rhythm. No murmur. Adequate perfusion/capillary refill. Femoral pulse present                  Abdomen:    Soft, non-tender, no masses, bowel sounds present, no HSM            Genitourinary:  Normal female genitalia, anus patent                          Spine:   No abnormalities noted       Musculoskeletal:   Full range of motion         Skin/Hair/Nails:   Skin warm, dry, and intact, no rashes or abnormal dyspigmentation or lesions               Neurologic:   No abnormal movement, tone appropriate for gestational age     Latch:  Efficiency:               Lips Flanged: Yes              Depth of latch: can get wide with hands on support, maintains this for a few bursts of sucking               Audible Swallow: Yes              Visible Milk: Yes              Wide Open/ Asymmetrical: Yes              Suck Swallow Cycle: Breathing: yes, Coordinated: yes  Nipple Assessment after latch: Normal: elongated/eraser, no discoloration and no damage noted.  Latch Problems: She is willing to gape well, roots quickly to stimuli, some hands on support provided to get her attached well - areolar compression and help with angling her corrected to reach up to the breast to attach. Mostly shallow latches today, but she does get some wide attachments with active sucks and swallows noted.         Position:  Infant's Ergonomics/Body               Body Alignment: Yes               Head Supported: Yes               Close to Mom's body/ Lifted/ Supported: Yes               Mom's Ergonomics/Body: Yes                           Supported: Yes                           Sitting Back: Yes                           Brings Baby to her breast: Yes  Positioning Problems: Reviewed BN hold and Mom returns this demonstration well.       Handouts:   PT/ Speech     Education:  Reviewed Latch: importance of deep latch without pain.   Reviewed Positioning for Dyad: proper alignment and head angle when positioning at the breast   Reviewed Frequency/Supply & Demand: offer the breast at each feeding, pump if baby is not latching and effective transferring milk.   Reviewed Infant:Cues and varied States of Awareness: watch for hunger cues, feed on demand. If baby seems satisfied at the breast (calm,  relaxed sleeping, breasts are softer) no need to pump or supplement   Reviewed Infant Elimination: goal of 6+ wets and 2-3 stools per day   Reviewed Alternative/Artificial Feedings: paced bottle feeding technique demonstrated  Reviewed Mom/Breast care: gentle handling of the breast at all times, discussed lymphatic drainage and reverse pressure softening, as well as tips for healing sore nipples.    Reviewed Equipment: Hand pump and electric pump general guidance, Discussed proper flange fit, how to measure        Plan:     Reassurance provided. Acknowledged Mom's goals and commitment to breastfeeding. Cont with positioning adjustments and watch for signs of effective feeding when offering the breast. Pump to replace feeding at the breast with bottle feeding or if latching becomes painful. Discussed utilizing lowest effective suction and cycle pumping techniques. Gentle handling of the breast at all times to preserve integrity.  Contact [facility] for breastfeeding support as needed or ongoing concerns with latching comfort and milk transfer. Follow up in 2 weeks.     I have spent 75 minutes with Patient and family today in which greater than 50% of this time was spent in counseling/coordination of care regarding Patient and family education.

## 2025-05-10 NOTE — PROGRESS NOTES
I have reviewed the notes, assessments, and/or procedures performed by Lorelei Jaimes RN, IBCLC, I concur with her/his documentation of Isaura Stevenson MD 05/10/25

## 2025-05-21 ENCOUNTER — OFFICE VISIT (OUTPATIENT)
Dept: POSTPARTUM | Facility: CLINIC | Age: 35
End: 2025-05-21

## 2025-05-21 NOTE — PROGRESS NOTES
"BREAST FEEDING FOLLOW UP VISIT    Informant/Relationship: Isaura (mom/self) and Tyler (FOB)    Discussion of General Lactation Issues: Isaura is reporting that on Mother's day she got \"off schedule\" and this decreased her milk supply drastically. Mom is finding it difficult to keep up with the pumping schedule.     The past few days Mom has not been pumping and has only been getting formula. She has not noticed any breast changes or discomfort.     Infant is 5 weeks old today.    Interval Breastfeeding History:    Frequency of breast feeding: none recently   Does mother feel breastfeeding is effective: No  Does infant appear satisfied after nursing:No  Stooling pattern normal:Yes  Urinating frequently:Yes  Using shield or shells:No    Alternative/Artificial Feedings:   Bottle: Yes, [place] and recently ordered [place]   Cup: No  Syringe/Finger: No           Formula Type: Similac Neosure                      Amount: 60-90 mL            Breast Milk:                      Amount: offering when available, but none the last few days             Frequency Q 2-3 Hr between feedings  Elimination Problems: No      Equipment:    Pump            Type: Spectra S1 and [place] Go             Frequency of Use: none in the past 48 hours       Equipment Problems: no      Mom:  Breast: Normal  Nipple Assessment in General: Normal: elongated/eraser, no discoloration and no damage noted.  Mother's Awareness of Feeding Cues                 Recognizes: Yes                  Verbalizes: Yes  Support System: good support   History of Breastfeeding: first time breastfeeding   Changes/Stressors/Violence: Mom has stopped pumping, she was feeling the schedule and little output was tough to keep up with   Concerns/Goals: Isaura is unsure if she'd like to continue providing breast milk. She does not want to exclusively pump on the schedule she was doing, this was unmanageable.     Problems with Mom: low production     Physical " Exam  Constitutional:       Appearance: Normal appearance.   HENT:      Head: Normocephalic.   Pulmonary:      Effort: Pulmonary effort is normal.     Musculoskeletal:         General: Normal range of motion.      Cervical back: Normal range of motion.     Neurological:      General: No focal deficit present.      Mental Status: She is alert and oriented to person, place, and time.     Skin:     General: Skin is warm.      Capillary Refill: Capillary refill takes less than 2 seconds.     Psychiatric:         Mood and Affect: Mood normal.         Behavior: Behavior normal.       Infant:  Behaviors: Alert  Color: Pink  Birth weight: 2145 g   Current weight: 3020 g     Problems with infant: chomping, disorganized suck, tension      General Appearance:  Alert, active, no distress                            Head:  Normocephalic, AFOF, sutures opposed                            Eyes:   Conjunctiva clear, no drainage                            Ears:   Normally placed, no anomolies                           Nose:   Septum intact, no drainage or erythema                          Mouth:  No lesions. Tongue flat when crying, tight jaw, biting motion when assessing. Baby has a large projectile spit during exam, but does show any distress before or after spit up. Manual lift shows rounded tongue tip. Frenulum is connected to the floor of the mouth and mid tongue, but tight on lift.                    Neck:  Supple, symmetrical, trachea midline                Respiratory:  No grunting, flaring, retractions, breath sounds clear and equal           Cardiovascular:  Regular rate and rhythm. No murmur. Adequate perfusion/capillary refill. Femoral pulse present                  Abdomen:    Soft, non-tender, no masses, bowel sounds present, no HSM            Genitourinary:  Normal female genitalia, anus patent                         Spine:   No abnormalities noted       Musculoskeletal:   Full range of motion         Skin/Hair/Nails:    Skin warm, dry, and intact, no rashes or abnormal dyspigmentation or lesions               Neurologic:   No abnormal movement, tone appropriate for gestational age     Latch:  Efficiency:               Lips Flanged: Yes              Depth of latch: wide              Audible Swallow: Yes              Visible Milk: Yes              Wide Open/ Asymmetrical: Yes              Suck Swallow Cycle: Breathing: yes, Coordinated: yes  Nipple Assessment after latch: Normal: elongated/eraser, no discoloration and no damage noted.  Latch Problems: She is able to get a deep latch briefly with guidance.     Position:  Infant's Ergonomics/Body               Body Alignment: Yes after review               Head Supported: Yes after review                Close to Mom's body/ Lifted/ Supported: Yes               Mom's Ergonomics/Body: Yes                           Supported: Yes                           Sitting Back: Yes after review                            Brings Baby to her breast: Yes after review   Positioning Problems: Isaura needs reminders to adjust positioning to guide baby to the breast.           Education:  Reviewed Latch: importance of deep latch without pain.   Reviewed Positioning for Dyad: proper alignment and head angle when positioning at the breast   Reviewed Frequency/Supply & Demand: offer the breast at each feeding, pump if baby is not latching and effective transferring milk.   Reviewed Infant:Cues and varied States of Awareness: watch for hunger cues, feed on demand. If baby seems satisfied at the breast (calm, relaxed sleeping, breasts are softer) no need to pump or supplement   Reviewed Infant Elimination: goal of 6+ wets and 2-3 stools per day   Reviewed Alternative/Artificial Feedings: paced bottle feeding technique demonstrated  Reviewed Mom/Breast care: gentle handling of the breast at all times, as well as tips for healing sore nipples.    Reviewed Equipment: Hand pump and electric pump general  guidance, Discussed proper flange fit, how to measure        Plan:  Continue to offer baby the breast when motivated to do so. If latching, watch for signs of hunger and fullness cues throughout feeding. Breast compressions throughout feeding to keep baby active, as needed. Paced bottle feeding recommended if offering bottles. Cont with therapies as recommended. Monitor diapers daily, follow up with Ped as recommended. Follow up with lactation as needed for ongoing support.    Reassurance provided that baby is growing well at this time. Cont with positioning adjustments and watch for signs of effective feeding if offering the breast. Pump as often as is manageable.  Gentle handling of the breast at all times to preserve integrity.  If weaning, take slow approach and watch for signs of breast discomfort/engorgement.  Contact [facility] for breastfeeding support as needed or ongoing concerns with latching comfort and milk transfer.      I have spent 60 minutes with Patient and family today in which greater than 50% of this time was spent in counseling/coordination of care regarding Patient and family education.

## 2025-05-21 NOTE — PATIENT INSTRUCTIONS
-I'm proud of you for being a great advocate for yourself and your baby!   -Okay to offer her the breast as often as motivated.  -Pump is also okay in whatever capacity is manageable for you. Breast milk is not all or nothing. There are benefits to any amount of breast milk provided to her!   -If choosing to wean fully, be mindful of your breast comfort. If needed, you may apply cold compresses to the breast or consider ibuprofen for any breast inflammation or discomfort. Okay to do short pumping sessions for comfort as needed.  -Follow up as needed for ongoing support at any time.

## 2025-07-09 ENCOUNTER — OFFICE VISIT (OUTPATIENT)
Dept: FAMILY MEDICINE CLINIC | Facility: CLINIC | Age: 35
End: 2025-07-09
Payer: COMMERCIAL

## 2025-07-09 VITALS
DIASTOLIC BLOOD PRESSURE: 80 MMHG | HEART RATE: 78 BPM | OXYGEN SATURATION: 99 % | SYSTOLIC BLOOD PRESSURE: 122 MMHG | BODY MASS INDEX: 24.64 KG/M2 | WEIGHT: 157 LBS | TEMPERATURE: 97.6 F | HEIGHT: 67 IN

## 2025-07-09 DIAGNOSIS — J06.9 ACUTE UPPER RESPIRATORY INFECTION: Primary | ICD-10-CM

## 2025-07-09 PROCEDURE — 99213 OFFICE O/P EST LOW 20 MIN: CPT | Performed by: STUDENT IN AN ORGANIZED HEALTH CARE EDUCATION/TRAINING PROGRAM

## 2025-07-09 RX ORDER — AZITHROMYCIN 250 MG/1
TABLET, FILM COATED ORAL
Qty: 6 TABLET | Refills: 0 | Status: SHIPPED | OUTPATIENT
Start: 2025-07-09 | End: 2025-07-14

## 2025-07-09 RX ORDER — SPIRONOLACTONE 50 MG/1
TABLET, FILM COATED ORAL
COMMUNITY
Start: 2025-06-17

## 2025-07-09 NOTE — PROGRESS NOTES
"Name: Isaura Ballesteros      : 1990      MRN: [id number]  Encounter Provider: Liz Chu DO  Encounter Date: 2025   Encounter department: [facility] [place]    Assessment & Plan  Acute upper respiratory infection  Has  at home - will start abx to help prevent transmission to baby  Start azithromycin  Orders:  •  azithromycin (Zithromax) 250 mg tablet; Take 2 tablets (500 mg total) by mouth daily for 1 day, THEN 1 tablet (250 mg total) daily for 4 days.           History of Present Illness     Chief Complaint   Patient presents with   • Cold coming on but have  at home. Hoping to get meds     HPI    New pt to me. Prior PCP Dr. Slater, last visit 2023  Almost 3 months postpartum, daughter Tawana. Everyone is adjusting well  No longer breastfeeding  NKDA  Started feeling sick 2 days ago - tickle in throat  Sore throat, nasal congestion, sneezing, cough    Review of Systems   Constitutional:  Positive for fatigue. Negative for chills and fever.   HENT:  Positive for congestion, sinus pressure, sneezing and sore throat. Negative for ear pain.    Eyes:  Negative for pain and visual disturbance.   Respiratory:  Positive for cough. Negative for shortness of breath.    Cardiovascular:  Negative for chest pain and palpitations.   Gastrointestinal:  Negative for abdominal pain, diarrhea, nausea and vomiting.   Genitourinary:  Negative for difficulty urinating and dysuria.   Musculoskeletal:  Negative for arthralgias and myalgias.   Skin:  Negative for rash.   Neurological:  Negative for dizziness, light-headedness and headaches.   All other systems reviewed and are negative.      Objective   /80   Pulse 78   Temp 97.6 °F (36.4 °C)   Ht 5' 7\" (1.702 m)   Wt 71.2 kg (157 lb)   SpO2 99%   BMI 24.59 kg/m²      Physical Exam  Vitals and nursing note reviewed.   Constitutional:       General: She is not in acute distress.     Appearance: She is well-developed. She is not " ill-appearing.   HENT:      Head: Normocephalic and atraumatic.      Right Ear: Tympanic membrane and ear canal normal.      Left Ear: Tympanic membrane and ear canal normal.      Nose: Congestion present.      Mouth/Throat:      Mouth: Mucous membranes are moist.      Pharynx: Oropharynx is clear. Posterior oropharyngeal erythema present. No oropharyngeal exudate.     Eyes:      Conjunctiva/sclera: Conjunctivae normal.       Cardiovascular:      Rate and Rhythm: Normal rate and regular rhythm.      Pulses: Normal pulses.      Heart sounds: No murmur heard.  Pulmonary:      Effort: Pulmonary effort is normal. No respiratory distress.      Breath sounds: Normal breath sounds. No wheezing or rales.   Abdominal:      General: There is no distension.      Palpations: Abdomen is soft.      Tenderness: There is no abdominal tenderness.     Musculoskeletal:      Cervical back: Neck supple.      Right lower leg: No edema.      Left lower leg: No edema.   Lymphadenopathy:      Cervical: No cervical adenopathy.     Skin:     General: Skin is warm and dry.      Capillary Refill: Capillary refill takes less than 2 seconds.     Neurological:      General: No focal deficit present.      Mental Status: She is alert. Mental status is at baseline.     Psychiatric:         Mood and Affect: Mood normal.

## 2025-07-24 ENCOUNTER — OFFICE VISIT (OUTPATIENT)
Dept: FAMILY MEDICINE CLINIC | Facility: CLINIC | Age: 35
End: 2025-07-24
Payer: COMMERCIAL

## 2025-07-24 VITALS
SYSTOLIC BLOOD PRESSURE: 122 MMHG | HEIGHT: 67 IN | TEMPERATURE: 97.5 F | DIASTOLIC BLOOD PRESSURE: 66 MMHG | RESPIRATION RATE: 17 BRPM | WEIGHT: 153.4 LBS | OXYGEN SATURATION: 99 % | BODY MASS INDEX: 24.08 KG/M2 | HEART RATE: 80 BPM

## 2025-07-24 DIAGNOSIS — R05.8 POST-VIRAL COUGH SYNDROME: Primary | ICD-10-CM

## 2025-07-24 PROCEDURE — 99213 OFFICE O/P EST LOW 20 MIN: CPT | Performed by: STUDENT IN AN ORGANIZED HEALTH CARE EDUCATION/TRAINING PROGRAM

## 2025-07-24 RX ORDER — ALBUTEROL SULFATE 90 UG/1
2 INHALANT RESPIRATORY (INHALATION) EVERY 6 HOURS PRN
Qty: 18 G | Refills: 0 | Status: SHIPPED | OUTPATIENT
Start: 2025-07-24

## 2025-07-24 NOTE — PROGRESS NOTES
Name: Isaura Ballesteros      : 1990      MRN: [id number]  Encounter Provider: Liz Chu DO  Encounter Date: 2025   Encounter department: [facility]    Assessment & Plan  Post-viral cough syndrome  Reviewed can linger 6-8 weeks  Physical exam unremarkable - no wheezing, vitals in normal range  To use albuterol inhaler particularly at night to help with coughing fits  Reviewed return precautions, anticipatory guidance  Orders:  •  albuterol (Ventolin HFA) 90 mcg/act inhaler; Inhale 2 puffs every 6 (six) hours as needed for wheezing           History of Present Illness     Chief Complaint   Patient presents with   • Follow-up     Feeling better, but cough lingering       HPI    Saw pt for early URI  treated with abx due to infant at home. Completed z pack    Feeling better overall, just with lingering cough particularly at night. Pt feels tickle in throat, gets in coughing fit and hears wheeze with cough. Cough is nonproductive  Took daughter to pediatrician yesterday, who also listened to pt's lungs and thought she was wheezing a bit and advised follow up  H/o asthma in childhood, no inhaler use in many years  Not taking anything over the counter lately. Few days ago used OTC alkaseltzer and dayquil    Review of Systems   Constitutional:  Negative for chills, fatigue and fever.   HENT:  Positive for postnasal drip. Negative for congestion and sore throat.    Eyes:  Negative for visual disturbance.   Respiratory:  Positive for cough and wheezing. Negative for shortness of breath.    Cardiovascular:  Negative for chest pain and palpitations.   Gastrointestinal:  Negative for abdominal pain, constipation, diarrhea, nausea and vomiting.   Musculoskeletal:  Negative for arthralgias and myalgias.   Skin:  Negative for rash.   Neurological:  Negative for light-headedness and headaches.       Objective   /66 (BP Location: Left arm, Patient Position: Sitting, Cuff Size:  "Standard)   Pulse 80   Temp 97.5 °F (36.4 °C) (Temporal)   Resp 17   Ht 5' 7\" (1.702 m)   Wt 69.6 kg (153 lb 6.4 oz)   SpO2 99%   BMI 24.03 kg/m²      Physical Exam  Vitals and nursing note reviewed.   Constitutional:       General: She is not in acute distress.     Appearance: She is well-developed. She is not ill-appearing.   HENT:      Head: Normocephalic and atraumatic.     Eyes:      Extraocular Movements: Extraocular movements intact.      Conjunctiva/sclera: Conjunctivae normal.       Cardiovascular:      Rate and Rhythm: Normal rate and regular rhythm.      Pulses: Normal pulses.      Heart sounds: No murmur heard.  Pulmonary:      Effort: Pulmonary effort is normal. No respiratory distress.      Breath sounds: Normal breath sounds. No wheezing, rhonchi or rales.   Abdominal:      General: There is no distension.      Palpations: Abdomen is soft.      Tenderness: There is no abdominal tenderness.     Musculoskeletal:      Cervical back: Neck supple.      Right lower leg: No edema.      Left lower leg: No edema.   Lymphadenopathy:      Cervical: No cervical adenopathy.     Skin:     General: Skin is warm and dry.      Capillary Refill: Capillary refill takes less than 2 seconds.     Neurological:      General: No focal deficit present.      Mental Status: She is alert. Mental status is at baseline.     Psychiatric:         Mood and Affect: Mood normal.         "

## 2025-08-12 ENCOUNTER — ANNUAL EXAM (OUTPATIENT)
Dept: OBGYN CLINIC | Facility: CLINIC | Age: 35
End: 2025-08-12
Payer: COMMERCIAL

## (undated) DEVICE — Device

## (undated) DEVICE — SUT VICRYL 0 CTX 36 IN J978H

## (undated) DEVICE — GLOVE INDICATOR PI UNDERGLOVE SZ 7.5 BLUE

## (undated) DEVICE — SUT MONOCRYL 3-0 UNDYED KS CS-1 Y523H

## (undated) DEVICE — TELFA NON-ADHERENT ABSORBENT DRESSING: Brand: TELFA

## (undated) DEVICE — SKIN MARKER DUAL TIP WITH RULER CAP, FLEXIBLE RULER AND LABELS: Brand: DEVON

## (undated) DEVICE — SUT PLAIN 2-0 CTX 27 IN 872H

## (undated) DEVICE — GLOVE PI ULTRA TOUCH SZ.7.0

## (undated) DEVICE — SUT VICRYL 0 CT-1 36 IN J946H

## (undated) DEVICE — PACK C-SECTION PBDS

## (undated) DEVICE — CHLORAPREP HI-LITE 26ML ORANGE